# Patient Record
Sex: FEMALE | Race: WHITE | NOT HISPANIC OR LATINO | Employment: OTHER | URBAN - METROPOLITAN AREA
[De-identification: names, ages, dates, MRNs, and addresses within clinical notes are randomized per-mention and may not be internally consistent; named-entity substitution may affect disease eponyms.]

---

## 2017-01-11 ENCOUNTER — OFFICE VISIT (OUTPATIENT)
Dept: FAMILY MEDICINE CLINIC | Facility: CLINIC | Age: 82
End: 2017-01-11

## 2017-01-11 VITALS
BODY MASS INDEX: 19.67 KG/M2 | OXYGEN SATURATION: 95 % | WEIGHT: 111 LBS | TEMPERATURE: 98.3 F | SYSTOLIC BLOOD PRESSURE: 102 MMHG | DIASTOLIC BLOOD PRESSURE: 60 MMHG | HEART RATE: 98 BPM | HEIGHT: 63 IN

## 2017-01-11 DIAGNOSIS — F43.21 COMPLICATED GRIEF: ICD-10-CM

## 2017-01-11 DIAGNOSIS — J30.9 ATOPIC RHINITIS: Primary | ICD-10-CM

## 2017-01-11 DIAGNOSIS — J01.10 ACUTE NON-RECURRENT FRONTAL SINUSITIS: ICD-10-CM

## 2017-01-11 PROBLEM — C55 MALIGNANT NEOPLASM OF UTERUS (HCC): Status: ACTIVE | Noted: 2017-01-11

## 2017-01-11 PROBLEM — E78.5 HYPERLIPIDEMIA: Status: ACTIVE | Noted: 2017-01-11

## 2017-01-11 PROCEDURE — 99214 OFFICE O/P EST MOD 30 MIN: CPT | Performed by: NURSE PRACTITIONER

## 2017-01-11 RX ORDER — FLUTICASONE PROPIONATE 50 MCG
2 SPRAY, SUSPENSION (ML) NASAL DAILY
Qty: 1 EACH | Refills: 0 | Status: SHIPPED | OUTPATIENT
Start: 2017-01-11 | End: 2017-02-10

## 2017-01-11 RX ORDER — AZITHROMYCIN 250 MG/1
TABLET, FILM COATED ORAL
Qty: 6 TABLET | Refills: 0 | Status: SHIPPED | OUTPATIENT
Start: 2017-01-11 | End: 2017-02-03 | Stop reason: HOSPADM

## 2017-01-11 NOTE — MR AVS SNAPSHOT
Chiquita Anderson   1/11/2017 2:00 PM   Office Visit    Provider:  KIRSTEN Martinez   Department:  [facility]   Dept Phone:  [phone]                Your Full Care Plan              Today's Medication Changes          These changes are accurate as of: 1/11/17  2:34 PM.  If you have any questions, ask your nurse or doctor.               New Medication(s)Ordered:     azithromycin 250 MG tablet   Commonly known as:  ZITHROMAX   Take 2 tablets the first day, then 1 tablet daily for 4 days.   Started by:  KIRSTEN Martinez       fluticasone 50 MCG/ACT nasal spray   Commonly known as:  FLONASE   2 sprays into each nostril Daily for 30 days. Administer 2 sprays in each nostril for each dose.   Started by:  KIRSTEN Martinez            Where to Get Your Medications      These medications were sent to [address] [address] [facility] [phone] [facility] [phone]   [address] [facility] [address]     Phone:  [phone]     azithromycin 250 MG tablet    fluticasone 50 MCG/ACT nasal spray                  Your Updated Medication List          This list is accurate as of: 1/11/17  2:34 PM.  Always use your most recent med list.                azithromycin 250 MG tablet   Commonly known as:  ZITHROMAX   Take 2 tablets the first day, then 1 tablet daily for 4 days.       clotrimazole 1 % cream   Commonly known as:  LOTRIMIN   Apply  topically 2 (Two) Times a Day.       FLUoxetine 20 MG tablet   Commonly known as:  PROzac   Take 1 tablet by mouth Daily for 90 days.       fluticasone 50 MCG/ACT nasal spray   Commonly known as:  FLONASE   2 sprays into each nostril Daily for 30 days. Administer 2 sprays in each nostril for each dose.       furosemide 20 MG tablet   Commonly known as:  LASIX       MULTIVITAL PO       pantoprazole 40 MG EC tablet   Commonly known as:  PROTONIX       pravastatin 80 MG tablet   Commonly known as:   "PRAVACHOL   TAKE ONE TABLET BY MOUTH DAILY       Vitamin D 1000 UNITS capsule       warfarin 5 MG tablet   Commonly known as:  COUMADIN               You Were Diagnosed With        Codes Comments    Atopic rhinitis    -  Primary ICD-10-CM: J30.9  ICD-9-CM: 477.9     Acute non-recurrent frontal sinusitis     ICD-10-CM: J01.10  ICD-9-CM: 461.1       Medications to be Given to You by a Medical Professional     Due       Frequency    12/29/2016 cyanocobalamin injection 1,000 mcg  Every 30 Days      Instructions     None    Patient Instructions History      MyChart Signup     Our records indicate that you have declined [facility] [employer] signup. If you would like to sign up for [employer], please email [email or web address] or call [phone] to obtain an activation code.             Other Info from Your Visit           Your Appointments     Jan 27, 2017  2:00 PM EST   Nurse Visit with NURSE/MA KELLY [facility] (--)    [address] [place] [id number]   [phone]            Feb 02, 2017  2:30 PM EST   Office Visit with KIRSTEN Martinez   [facility] (--)    [address] [place] [id number] [phone]           Arrive 15 minutes prior to appointment.              Allergies     Amiodarone      Lung toxicity    Amoxicillin-pot Clavulanate        Reason for Visit     Illness pt has sore throat, cough, congestion and coughing up beige mucus.      Vital Signs     Blood Pressure Pulse Temperature Height Weight Oxygen Saturation    102/60 (BP Location: Right arm, Patient Position: Sitting, Cuff Size: Adult) 98 98.3 °F (36.8 °C) (Oral) 63\" (160 cm) 111 lb (50.3 kg) 95%    Body Mass Index Smoking Status                19.66 kg/m2 Never Smoker          Problems and Diagnoses Noted     Nasal inflammation due to allergen    Acid reflux disease    High cholesterol or triglycerides    Long term use " of blood thinners    Cancer of uterus    Atrial fibrillation (irregular heartbeat)    Acute non-recurrent frontal sinusitis

## 2017-01-11 NOTE — PROGRESS NOTES
"Bita Anderson is a 81 y.o. female. Pt is here for illness. She has been sick for the past 4-5 days with sore throat, cough and congestion. She is coughing up beige colored mucus.    History of Present Illness   Daughter with similar illness with ST and hoarseness, cough, +nasal discharge. Cough is productive. No headache. No fever or chills. +sneezing  The following portions of the patient's history were reviewed and updated as appropriate: allergies, current medications, past family history, past medical history, past social history, past surgical history and problem list.    Review of Systems   Constitutional: Negative for chills and fever.   HENT: Positive for congestion, ear pain, rhinorrhea, sinus pressure, sneezing and sore throat. Negative for ear discharge, facial swelling, hearing loss, nosebleeds and trouble swallowing.    Respiratory: Positive for cough. Negative for shortness of breath and wheezing.    Cardiovascular: Negative.  Negative for chest pain.   Gastrointestinal: Negative for abdominal pain.   Endocrine: Negative.    Musculoskeletal: Negative for arthralgias.   Allergic/Immunologic: Negative for environmental allergies.   Psychiatric/Behavioral: Positive for decreased concentration and dysphoric mood. Negative for self-injury and suicidal ideas. The patient is nervous/anxious.      Visit Vitals   • /60 (BP Location: Right arm, Patient Position: Sitting, Cuff Size: Adult)   • Pulse 98   • Temp 98.3 °F (36.8 °C) (Oral)   • Ht 63\" (160 cm)   • Wt 111 lb (50.3 kg)   • SpO2 95%   • BMI 19.66 kg/m2     Objective   Physical Exam   Constitutional: She appears cachectic. No distress.   HENT:   Head: Normocephalic.   Right Ear: Tympanic membrane is retracted. A middle ear effusion is present.   Left Ear: Tympanic membrane is retracted. A middle ear effusion is present.   Nose: Mucosal edema and rhinorrhea present.   Mouth/Throat: Oropharyngeal exudate and posterior oropharyngeal erythema " present.   Cardiovascular: Normal rate, regular rhythm, normal heart sounds and intact distal pulses.    Pulmonary/Chest: Effort normal and breath sounds normal. No respiratory distress. She has no wheezes. She has no rales.   Lymphadenopathy:     She has no cervical adenopathy.   Skin: She is not diaphoretic.   Psychiatric: Judgment and thought content normal. She exhibits a depressed mood.   Smiling more     Assessment/Plan   Problems Addressed this Visit        Respiratory    Atopic rhinitis - Primary       Other    Complicated grief      Other Visit Diagnoses     Acute non-recurrent frontal sinusitis             Pt is here for illness. She has been sick for the past 4-5 days with sore throat, cough and congestion. She is coughing up beige colored mucus. Reports is using nose spray, reports is 4 yrs old. To continue, but with new refill.     INR 2.1 yesterday, to start azithromycin, let coag clinic know for earlier recheck.     To have upper scope in 1-2 weeks for acid reflux, reasonable, if additional difficulty with aspiration    With grief, has a new mattress, but still sleeping on couch (since March) reports eating as much as can, but still intermittent. Daughter still stocking her fridge as well. Overall better eye contact and smiling today. Mildly able to keep more on story total, though still some vocalizations of sadness. Continue same med now, consider increase in dosing at follow up.

## 2017-01-27 ENCOUNTER — CLINICAL SUPPORT (OUTPATIENT)
Dept: FAMILY MEDICINE CLINIC | Facility: CLINIC | Age: 82
End: 2017-01-27

## 2017-01-27 DIAGNOSIS — E53.8 B12 DEFICIENCY: ICD-10-CM

## 2017-01-27 PROCEDURE — 96372 THER/PROPH/DIAG INJ SC/IM: CPT | Performed by: FAMILY MEDICINE

## 2017-01-27 RX ADMIN — CYANOCOBALAMIN 1000 MCG: 1000 INJECTION, SOLUTION INTRAMUSCULAR; SUBCUTANEOUS at 14:02

## 2017-02-03 ENCOUNTER — OFFICE VISIT (OUTPATIENT)
Dept: FAMILY MEDICINE CLINIC | Facility: CLINIC | Age: 82
End: 2017-02-03

## 2017-02-03 VITALS
OXYGEN SATURATION: 100 % | SYSTOLIC BLOOD PRESSURE: 128 MMHG | DIASTOLIC BLOOD PRESSURE: 68 MMHG | HEIGHT: 63 IN | TEMPERATURE: 97 F | WEIGHT: 109 LBS | HEART RATE: 67 BPM | BODY MASS INDEX: 19.31 KG/M2

## 2017-02-03 DIAGNOSIS — E53.8 B12 DEFICIENCY: Primary | ICD-10-CM

## 2017-02-03 DIAGNOSIS — E55.9 VITAMIN D DEFICIENCY: ICD-10-CM

## 2017-02-03 DIAGNOSIS — F43.21 COMPLICATED GRIEF: ICD-10-CM

## 2017-02-03 DIAGNOSIS — R63.4 WEIGHT LOSS, ABNORMAL: ICD-10-CM

## 2017-02-03 DIAGNOSIS — D51.9 ANEMIA DUE TO VITAMIN B12 DEFICIENCY, UNSPECIFIED B12 DEFICIENCY TYPE: ICD-10-CM

## 2017-02-03 PROCEDURE — 99214 OFFICE O/P EST MOD 30 MIN: CPT | Performed by: NURSE PRACTITIONER

## 2017-02-03 RX ORDER — FLUOXETINE HYDROCHLORIDE 40 MG/1
40 CAPSULE ORAL DAILY
Qty: 30 CAPSULE | Refills: 0
Start: 2017-02-03 | End: 2017-02-06 | Stop reason: SDUPTHER

## 2017-02-03 NOTE — PROGRESS NOTES
"Subjective   Chiquita Anderson is a 81 y.o. female who is with a possible sinus infection and follow up on depression.     History of Present Illness   Seems to stay weak and tired. Not fully over sinus infection. Not eating many vegetables, Has not started ensure, and won't start until Helena oks, though coagulation nurse has already given ok, more than once.    Still not sleeping well. [phone]. Not sleeping in the bed.   The following portions of the patient's history were reviewed and updated as appropriate: allergies, current medications, past family history, past medical history, past social history, past surgical history and problem list.    Review of Systems   Constitutional: Positive for fatigue and unexpected weight change. Negative for activity change, appetite change, chills and fever.   HENT: Positive for ear pain. Negative for congestion and sore throat.    Cardiovascular: Negative for chest pain, palpitations and leg swelling.   Gastrointestinal: Negative for abdominal pain and constipation.        Appetite comes and goes, some days good   Endocrine: Negative.    Genitourinary: Positive for urgency (whan takes water  pill in the mornings).   Musculoskeletal: Positive for arthralgias and gait problem. Negative for myalgias and neck stiffness.   Allergic/Immunologic: Negative for food allergies and immunocompromised state.   Neurological: Positive for headaches.   Hematological: Negative.    Psychiatric/Behavioral: Positive for behavioral problems and confusion (daughter believes worse when fatigued).     Visit Vitals   • /68 (BP Location: Right arm, Patient Position: Sitting, Cuff Size: Adult)   • Pulse 67   • Temp 97 °F (36.1 °C) (Oral)   • Ht 63\" (160 cm)   • Wt 109 lb (49.4 kg)   • SpO2 100%   • BMI 19.31 kg/m2     Objective   Physical Exam   Constitutional: She appears well-developed. She appears cachectic.   HENT:   Head: Normocephalic and atraumatic.   Right Ear: Tympanic membrane is scarred. "   Left Ear: Tympanic membrane is scarred.   Nose: No mucosal edema or rhinorrhea. Right sinus exhibits no maxillary sinus tenderness and no frontal sinus tenderness. Left sinus exhibits no maxillary sinus tenderness and no frontal sinus tenderness.   Mouth/Throat: Oropharynx is clear and moist.   Neck: Normal range of motion. Neck supple. No thyromegaly present.   Cardiovascular: Normal rate, regular rhythm and normal heart sounds.    Pulses:       Carotid pulses are 2+ on the right side, and 2+ on the left side.  Pulmonary/Chest: Effort normal and breath sounds normal.   Abdominal: Soft. Bowel sounds are normal. There is no tenderness.   Lymphadenopathy:        Head (right side): No submental, no submandibular and no tonsillar adenopathy present.        Head (left side): No submental, no submandibular and no tonsillar adenopathy present.     She has no cervical adenopathy.   Neurological: She is alert.   Skin: She is not diaphoretic.   Psychiatric: She has a normal mood and affect. Her behavior is normal. Judgment and thought content normal.     Assessment/Plan   Problems Addressed this Visit        Digestive    B12 deficiency anemia    Relevant Orders    CBC (No Diff) (Completed)    B12 deficiency - Primary    Relevant Orders    Vitamin B12 (Completed)    Vitamin D deficiency    Relevant Orders    Vitamin D 25 Hydroxy (Completed)       Other    Weight loss, abnormal    Relevant Orders    Comprehensive Metabolic Panel (Completed)    T4, Free (Completed)    TSH (Completed)    Complicated grief        To address sleep, Has cut back on coffee, has significant upset over furnace making noise, etc. Mostly not sleeping well, recommend increase fluoxetine if no significant abnormal on labs, consider retry of mirtazapine, as took only 1 dose. Other AD. Strongly encouraged to seek grief counseling. Start ensure, start sleeping in a bed, though won't sleep in new bed in the master, nor will consider sleeping in second  bedroom, which was  son's.     FU 3 months, sooner if needed

## 2017-02-04 LAB
25(OH)D3+25(OH)D2 SERPL-MCNC: 35.2 NG/ML (ref 30–100)
ALBUMIN SERPL-MCNC: 4.2 G/DL (ref 3.5–4.7)
ALBUMIN/GLOB SERPL: 2 {RATIO} (ref 1.1–2.5)
ALP SERPL-CCNC: 75 IU/L (ref 39–117)
ALT SERPL-CCNC: 21 IU/L (ref 0–32)
AST SERPL-CCNC: 34 IU/L (ref 0–40)
BILIRUB SERPL-MCNC: 0.5 MG/DL (ref 0–1.2)
BUN SERPL-MCNC: 16 MG/DL (ref 8–27)
BUN/CREAT SERPL: 25 (ref 11–26)
CALCIUM SERPL-MCNC: 8.9 MG/DL (ref 8.7–10.3)
CHLORIDE SERPL-SCNC: 99 MMOL/L (ref 96–106)
CO2 SERPL-SCNC: 25 MMOL/L (ref 18–29)
CREAT SERPL-MCNC: 0.65 MG/DL (ref 0.57–1)
ERYTHROCYTE [DISTWIDTH] IN BLOOD BY AUTOMATED COUNT: 15.2 % (ref 12.3–15.4)
GLOBULIN SER CALC-MCNC: 2.1 G/DL (ref 1.5–4.5)
GLUCOSE SERPL-MCNC: 83 MG/DL (ref 65–99)
HCT VFR BLD AUTO: 32.9 % (ref 34–46.6)
HGB BLD-MCNC: 11 G/DL (ref 11.1–15.9)
MCH RBC QN AUTO: 27.6 PG (ref 26.6–33)
MCHC RBC AUTO-ENTMCNC: 33.4 G/DL (ref 31.5–35.7)
MCV RBC AUTO: 83 FL (ref 79–97)
PLATELET # BLD AUTO: 190 X10E3/UL (ref 150–379)
POTASSIUM SERPL-SCNC: 3.9 MMOL/L (ref 3.5–5.2)
PROT SERPL-MCNC: 6.3 G/DL (ref 6–8.5)
RBC # BLD AUTO: 3.99 X10E6/UL (ref 3.77–5.28)
SODIUM SERPL-SCNC: 141 MMOL/L (ref 134–144)
T4 FREE SERPL-MCNC: 0.81 NG/DL (ref 0.82–1.77)
TSH SERPL DL<=0.005 MIU/L-ACNC: 3.61 UIU/ML (ref 0.45–4.5)
VIT B12 SERPL-MCNC: 864 PG/ML (ref 211–946)
WBC # BLD AUTO: 4.7 X10E3/UL (ref 3.4–10.8)

## 2017-02-06 ENCOUNTER — TELEPHONE (OUTPATIENT)
Dept: FAMILY MEDICINE CLINIC | Facility: CLINIC | Age: 82
End: 2017-02-06

## 2017-02-06 RX ORDER — FLUOXETINE HYDROCHLORIDE 40 MG/1
40 CAPSULE ORAL DAILY
Qty: 30 CAPSULE | Refills: 3 | Status: SHIPPED | OUTPATIENT
Start: 2017-02-06 | End: 2017-03-01 | Stop reason: HOSPADM

## 2017-02-08 ENCOUNTER — TELEPHONE (OUTPATIENT)
Dept: FAMILY MEDICINE CLINIC | Facility: CLINIC | Age: 82
End: 2017-02-08

## 2017-02-08 NOTE — TELEPHONE ENCOUNTER
Pts daughter said her mother won't take the 40mg at all.  Pts daughter said her mother said she won't take the 40mg and wants to know if you will just continue prescribing the 20mg to her mother.  Pts daughter said her mother won't go to counseling either.  C: [phone]

## 2017-02-20 RX ORDER — PRAVASTATIN SODIUM 80 MG/1
TABLET ORAL
Qty: 90 TABLET | Refills: 0 | Status: SHIPPED | OUTPATIENT
Start: 2017-02-20 | End: 2017-05-10

## 2017-02-27 ENCOUNTER — CLINICAL SUPPORT (OUTPATIENT)
Dept: FAMILY MEDICINE CLINIC | Facility: CLINIC | Age: 82
End: 2017-02-27

## 2017-02-27 DIAGNOSIS — E53.8 B12 DEFICIENCY: ICD-10-CM

## 2017-02-27 PROCEDURE — 96372 THER/PROPH/DIAG INJ SC/IM: CPT | Performed by: FAMILY MEDICINE

## 2017-02-27 RX ADMIN — CYANOCOBALAMIN 1000 MCG: 1000 INJECTION, SOLUTION INTRAMUSCULAR; SUBCUTANEOUS at 13:48

## 2017-03-01 ENCOUNTER — OFFICE VISIT (OUTPATIENT)
Dept: FAMILY MEDICINE CLINIC | Facility: CLINIC | Age: 82
End: 2017-03-01

## 2017-03-01 VITALS
BODY MASS INDEX: 20.02 KG/M2 | OXYGEN SATURATION: 99 % | SYSTOLIC BLOOD PRESSURE: 136 MMHG | HEART RATE: 75 BPM | HEIGHT: 63 IN | TEMPERATURE: 97.9 F | DIASTOLIC BLOOD PRESSURE: 66 MMHG | WEIGHT: 113 LBS

## 2017-03-01 DIAGNOSIS — S90.02XA CONTUSION OF LEFT ANKLE, INITIAL ENCOUNTER: ICD-10-CM

## 2017-03-01 DIAGNOSIS — F43.21 COMPLICATED GRIEF: Primary | ICD-10-CM

## 2017-03-01 PROCEDURE — 99213 OFFICE O/P EST LOW 20 MIN: CPT | Performed by: NURSE PRACTITIONER

## 2017-03-01 RX ORDER — FLUOXETINE HYDROCHLORIDE 20 MG/1
20 CAPSULE ORAL DAILY
COMMUNITY
End: 2017-03-01 | Stop reason: ALTCHOICE

## 2017-03-01 NOTE — PROGRESS NOTES
"Subjective   Chiquita Anderson is a 81 y.o. female who is here for a bruise on her left lower leg and would also like to discuss her depression meds.     History of Present Illness   On anticoagulation, bruise onset after backed into her table 5 days ago, worried about bruising and knot on LLE medial calf.     Daughter reports voice loss in last 3 weeks. States mom reporting a lot of dizziness associated with dosing of medications, though pt denies.   The following portions of the patient's history were reviewed and updated as appropriate: allergies, current medications, past family history, past medical history, past social history, past surgical history and problem list.    Review of Systems   Constitutional: Positive for fatigue and unexpected weight change (4 lb gain). Negative for activity change, appetite change, chills and fever.   HENT: Positive for rhinorrhea. Negative for ear pain.    Cardiovascular: Negative for chest pain, palpitations and leg swelling.        Sees cardiology next week.   Musculoskeletal: Positive for back pain (arthritis). Neck pain: arthritis.   Allergic/Immunologic: Positive for environmental allergies.   Neurological: Positive for dizziness. Negative for tremors, seizures, weakness and light-headedness.        No falls   Hematological: Bruises/bleeds easily.   Psychiatric/Behavioral: Positive for dysphoric mood and sleep disturbance. Negative for self-injury and suicidal ideas.     Visit Vitals   • /66 (BP Location: Right arm, Patient Position: Sitting, Cuff Size: Adult)   • Pulse 75   • Temp 97.9 °F (36.6 °C) (Oral)   • Ht 63\" (160 cm)   • Wt 113 lb (51.3 kg)   • SpO2 99%   • BMI 20.02 kg/m2     Objective   Physical Exam   Constitutional: She appears well-developed and well-nourished. No distress.   Cardiovascular: Normal rate.    Pulmonary/Chest: Effort normal.   Skin: Skin is warm and dry. She is not diaphoretic. There is erythema (medial shin  L lower leg).   Psychiatric: Thought " content normal. Her mood appears anxious. Cognition and memory are impaired. She exhibits a depressed mood. She is inattentive.   Nursing note and vitals reviewed.    Assessment/Plan   Problems Addressed this Visit        Other    Complicated grief - Primary      Other Visit Diagnoses     Contusion of left ankle, initial encounter            Still fidgeting day and night. Will not return to the bed. Will start new sertraline stop fluoxetine. Call if any side effects. Strongly encouraged to consider grief counseling.

## 2017-03-02 ENCOUNTER — TELEPHONE (OUTPATIENT)
Dept: FAMILY MEDICINE CLINIC | Facility: CLINIC | Age: 82
End: 2017-03-02

## 2017-03-10 ENCOUNTER — OFFICE VISIT (OUTPATIENT)
Dept: FAMILY MEDICINE CLINIC | Facility: CLINIC | Age: 82
End: 2017-03-10

## 2017-03-10 VITALS
OXYGEN SATURATION: 98 % | SYSTOLIC BLOOD PRESSURE: 150 MMHG | BODY MASS INDEX: 20.55 KG/M2 | DIASTOLIC BLOOD PRESSURE: 68 MMHG | WEIGHT: 116 LBS | HEIGHT: 63 IN | HEART RATE: 68 BPM | TEMPERATURE: 98 F

## 2017-03-10 DIAGNOSIS — R64 CACHEXIA (HCC): Primary | ICD-10-CM

## 2017-03-10 DIAGNOSIS — F43.21 COMPLICATED GRIEF: ICD-10-CM

## 2017-03-10 PROCEDURE — 99213 OFFICE O/P EST LOW 20 MIN: CPT | Performed by: NURSE PRACTITIONER

## 2017-03-10 NOTE — PROGRESS NOTES
"Bita Anderson is a 81 y.o. female who presents for a follow up on complicated grief. Was having dizzy spells and abnormal INR while on sertraline. Stopped taking 3 days ago.     History of Present Illness   Took sertraline for 1 week and noted throat burning and GI upset. Reports a fall in the kitchen, not hurt due to dizziness while on. Doing better since off now for 3 days, does eat with daughter's family often.     The following portions of the patient's history were reviewed and updated as appropriate: allergies, current medications, past family history, past medical history, past social history, past surgical history and problem list.    Review of Systems   Constitutional: Positive for activity change and fatigue. Negative for fever and unexpected weight change.   HENT: Negative.    Respiratory: Negative.    Cardiovascular: Negative.    Gastrointestinal: Negative.  Negative for abdominal pain.   Endocrine: Negative for cold intolerance, heat intolerance, polydipsia, polyphagia and polyuria.   Musculoskeletal: Positive for arthralgias and back pain.   Neurological: Negative for seizures, light-headedness and headaches.   Hematological: Negative.    Psychiatric/Behavioral: Positive for decreased concentration, dysphoric mood and sleep disturbance (slept unitl 9am this morning). Negative for agitation, confusion, hallucinations, self-injury and suicidal ideas. The patient is nervous/anxious. The patient is not hyperactive.         Grief     Visit Vitals   • /68   • Pulse 68   • Temp 98 °F (36.7 °C) (Oral)   • Ht 63\" (160 cm)   • Wt 116 lb (52.6 kg)   • SpO2 98%   • BMI 20.55 kg/m2     Objective   Physical Exam   Constitutional: She appears well-developed and well-nourished.   Cardiovascular: Normal rate.    Pulmonary/Chest: Effort normal.   Skin: Skin is warm and dry.   Psychiatric: Her behavior is normal. Judgment and thought content normal. Her mood appears anxious. She exhibits a depressed " mood.   Nursing note and vitals reviewed.    Assessment/Plan   Problems Addressed this Visit        Other    Cachexia - Primary    Complicated grief        Desires a trial without medication for now. Encouraged to rest and eat well. Keep phone with her at all times. FU prn and 6 months. Best option if tried medication would be mirtazapine.

## 2017-03-20 ENCOUNTER — OFFICE VISIT (OUTPATIENT)
Dept: FAMILY MEDICINE CLINIC | Facility: CLINIC | Age: 82
End: 2017-03-20

## 2017-03-20 VITALS
HEART RATE: 101 BPM | DIASTOLIC BLOOD PRESSURE: 76 MMHG | HEIGHT: 63 IN | OXYGEN SATURATION: 99 % | TEMPERATURE: 97.6 F | BODY MASS INDEX: 20.2 KG/M2 | WEIGHT: 114 LBS | SYSTOLIC BLOOD PRESSURE: 128 MMHG

## 2017-03-20 DIAGNOSIS — M54.50 ACUTE MIDLINE LOW BACK PAIN WITHOUT SCIATICA: ICD-10-CM

## 2017-03-20 DIAGNOSIS — R26.9 GAIT ABNORMALITY: Primary | ICD-10-CM

## 2017-03-20 PROCEDURE — 99213 OFFICE O/P EST LOW 20 MIN: CPT | Performed by: NURSE PRACTITIONER

## 2017-03-20 PROCEDURE — 72100 X-RAY EXAM L-S SPINE 2/3 VWS: CPT | Performed by: NURSE PRACTITIONER

## 2017-03-20 NOTE — PROGRESS NOTES
"Bita Anderson is a 81 y.o. female who presents for follow up after a fall that happened at home 1 week ago. Tripped over slippers, hit head on cabinet and landed on buttocks.     History of Present Illness   Reports since the fall feeling pain in buttocks since the fall. Also having some low back pain since the fall. Distantly had epidurals from Dr. Martinez. Here today as pain in the back area. No pain until the fall.   The following portions of the patient's history were reviewed and updated as appropriate: allergies, current medications, past family history, past medical history, past social history, past surgical history and problem list.    Review of Systems   Constitutional: Positive for activity change. Negative for fever.   Respiratory: Negative for chest tightness and shortness of breath.    Gastrointestinal: Negative for constipation (no bowel or bladder dysfunction) and diarrhea.   Genitourinary: Negative for difficulty urinating.   Musculoskeletal: Positive for back pain and gait problem. Negative for arthralgias, joint swelling and myalgias.   Neurological: Negative for weakness (leg) and headaches.   Psychiatric/Behavioral: Positive for dysphoric mood. The patient is nervous/anxious.      Visit Vitals   • /76   • Pulse 101   • Temp 97.6 °F (36.4 °C) (Oral)   • Ht 63\" (160 cm)   • Wt 114 lb (51.7 kg)   • SpO2 99%   • BMI 20.19 kg/m2     Objective   Physical Exam   Constitutional: She is oriented to person, place, and time. She appears well-developed and well-nourished. No distress.   Musculoskeletal: She exhibits no edema or tenderness.        Lumbar back: She exhibits pain. She exhibits normal range of motion, no tenderness, no bony tenderness, no swelling, no edema and no spasm.   SLR unique negative, Angelo negative unique, + SI tenderness, negative piriformis to distraction   Neurological: She is alert and oriented to person, place, and time. No sensory deficit. She exhibits normal muscle " tone.   Reflex Scores:       Patellar reflexes are 2+ on the right side and 2+ on the left side.       Achilles reflexes are 2+ on the right side and 2+ on the left side.  Nursing note and vitals reviewed.    Assessment/Plan   Problems Addressed this Visit        Other    Gait abnormality - Primary      Other Visit Diagnoses     Acute midline low back pain without sciatica        Relevant Orders    XR Spine Lumbar 2 or 3 View        Alternate ice and heat. Try tylenol for the back pain. FU 2-3 weeks if not settling. Topical cream if needed for pain.    XR with NAD, no comparison available. Will send to radiology for opinion.

## 2017-03-21 ENCOUNTER — TELEPHONE (OUTPATIENT)
Dept: FAMILY MEDICINE CLINIC | Facility: CLINIC | Age: 82
End: 2017-03-21

## 2017-03-21 NOTE — TELEPHONE ENCOUNTER
Patients daughter agrees to MRI of spine as suggested by radiologist. She also wants a ct of head ordered. Patient had nose bleed this morning and is worried. The bleeding has stopped. She has appt for INR with cardiology tomorrow. Patient advised if bleed starts again and does not stop then proceed to ER as is on blood thinners. Need orders for MRI and CT if okay.

## 2017-03-22 DIAGNOSIS — IMO0002 COMPRESSION FRACTURE: ICD-10-CM

## 2017-03-22 DIAGNOSIS — R04.0 EPISTAXIS: Primary | ICD-10-CM

## 2017-03-29 ENCOUNTER — CLINICAL SUPPORT (OUTPATIENT)
Dept: FAMILY MEDICINE CLINIC | Facility: CLINIC | Age: 82
End: 2017-03-29

## 2017-03-29 DIAGNOSIS — D51.9 ANEMIA DUE TO VITAMIN B12 DEFICIENCY, UNSPECIFIED B12 DEFICIENCY TYPE: ICD-10-CM

## 2017-03-29 PROCEDURE — 96372 THER/PROPH/DIAG INJ SC/IM: CPT | Performed by: FAMILY MEDICINE

## 2017-03-29 RX ADMIN — CYANOCOBALAMIN 1000 MCG: 1000 INJECTION, SOLUTION INTRAMUSCULAR; SUBCUTANEOUS at 13:53

## 2017-04-04 ENCOUNTER — TELEPHONE (OUTPATIENT)
Dept: FAMILY MEDICINE CLINIC | Facility: CLINIC | Age: 82
End: 2017-04-04

## 2017-04-05 NOTE — TELEPHONE ENCOUNTER
No new findings in the brain, age related changes only. Lspine with Degenerative disc disease and subacute compression fractures. I feel most of her back pain coming from degenerative disc disease, recommend epidural injection if still with intolerable levels of back pain.

## 2017-04-05 NOTE — TELEPHONE ENCOUNTER
Patient and daughter notified. Patient is going for INR today, will discuss epidural with cardiology and call back if desires to proceed.

## 2017-04-24 ENCOUNTER — CLINICAL SUPPORT (OUTPATIENT)
Dept: FAMILY MEDICINE CLINIC | Facility: CLINIC | Age: 82
End: 2017-04-24

## 2017-04-24 DIAGNOSIS — E53.8 B12 DEFICIENCY: ICD-10-CM

## 2017-04-24 PROCEDURE — 96372 THER/PROPH/DIAG INJ SC/IM: CPT | Performed by: NURSE PRACTITIONER

## 2017-04-24 RX ADMIN — CYANOCOBALAMIN 1000 MCG: 1000 INJECTION, SOLUTION INTRAMUSCULAR; SUBCUTANEOUS at 14:04

## 2017-04-27 ENCOUNTER — TELEPHONE (OUTPATIENT)
Dept: FAMILY MEDICINE CLINIC | Facility: CLINIC | Age: 82
End: 2017-04-27

## 2017-04-27 DIAGNOSIS — M54.50 LUMBAR SPINE PAIN: ICD-10-CM

## 2017-04-27 DIAGNOSIS — R29.898 LEG WEAKNESS, BILATERAL: Primary | ICD-10-CM

## 2017-05-10 ENCOUNTER — OFFICE VISIT (OUTPATIENT)
Dept: FAMILY MEDICINE CLINIC | Facility: CLINIC | Age: 82
End: 2017-05-10

## 2017-05-10 VITALS
TEMPERATURE: 97.4 F | DIASTOLIC BLOOD PRESSURE: 74 MMHG | OXYGEN SATURATION: 99 % | HEIGHT: 63 IN | SYSTOLIC BLOOD PRESSURE: 122 MMHG | BODY MASS INDEX: 21.09 KG/M2 | WEIGHT: 119 LBS | HEART RATE: 76 BPM

## 2017-05-10 DIAGNOSIS — R26.9 GAIT ABNORMALITY: Primary | ICD-10-CM

## 2017-05-10 PROCEDURE — 99213 OFFICE O/P EST LOW 20 MIN: CPT | Performed by: NURSE PRACTITIONER

## 2017-05-17 ENCOUNTER — OFFICE VISIT (OUTPATIENT)
Dept: FAMILY MEDICINE CLINIC | Facility: CLINIC | Age: 82
End: 2017-05-17

## 2017-05-17 VITALS
TEMPERATURE: 98.6 F | DIASTOLIC BLOOD PRESSURE: 62 MMHG | HEART RATE: 91 BPM | SYSTOLIC BLOOD PRESSURE: 118 MMHG | BODY MASS INDEX: 21.62 KG/M2 | OXYGEN SATURATION: 99 % | WEIGHT: 122 LBS | HEIGHT: 63 IN

## 2017-05-17 DIAGNOSIS — Z00.00 MEDICARE ANNUAL WELLNESS VISIT, INITIAL: Primary | ICD-10-CM

## 2017-05-17 PROCEDURE — G0438 PPPS, INITIAL VISIT: HCPCS | Performed by: NURSE PRACTITIONER

## 2017-05-30 ENCOUNTER — CLINICAL SUPPORT (OUTPATIENT)
Dept: FAMILY MEDICINE CLINIC | Facility: CLINIC | Age: 82
End: 2017-05-30

## 2017-05-30 DIAGNOSIS — D51.9 ANEMIA DUE TO VITAMIN B12 DEFICIENCY, UNSPECIFIED B12 DEFICIENCY TYPE: ICD-10-CM

## 2017-05-30 PROCEDURE — 96372 THER/PROPH/DIAG INJ SC/IM: CPT | Performed by: FAMILY MEDICINE

## 2017-05-30 RX ADMIN — CYANOCOBALAMIN 1000 MCG: 1000 INJECTION, SOLUTION INTRAMUSCULAR; SUBCUTANEOUS at 13:49

## 2017-06-22 ENCOUNTER — TELEPHONE (OUTPATIENT)
Dept: FAMILY MEDICINE CLINIC | Facility: CLINIC | Age: 82
End: 2017-06-22

## 2017-06-22 DIAGNOSIS — R29.898 BILATERAL LEG WEAKNESS: Primary | ICD-10-CM

## 2017-06-22 NOTE — TELEPHONE ENCOUNTER
Patient went to PT one time in May and then stopped going to swelling in legs. She wants to go back now but the rehab had discharged her for not coming. She needs a new order and wants it faxed to [phone]. Okay for order?

## 2017-07-03 ENCOUNTER — CLINICAL SUPPORT (OUTPATIENT)
Dept: FAMILY MEDICINE CLINIC | Facility: CLINIC | Age: 82
End: 2017-07-03

## 2017-07-03 DIAGNOSIS — D51.9 ANEMIA DUE TO VITAMIN B12 DEFICIENCY, UNSPECIFIED B12 DEFICIENCY TYPE: ICD-10-CM

## 2017-07-03 PROCEDURE — 96372 THER/PROPH/DIAG INJ SC/IM: CPT | Performed by: FAMILY MEDICINE

## 2017-07-03 RX ADMIN — CYANOCOBALAMIN 1000 MCG: 1000 INJECTION, SOLUTION INTRAMUSCULAR; SUBCUTANEOUS at 13:59

## 2017-08-28 ENCOUNTER — OFFICE VISIT (OUTPATIENT)
Dept: FAMILY MEDICINE CLINIC | Facility: CLINIC | Age: 82
End: 2017-08-28

## 2017-08-28 VITALS
BODY MASS INDEX: 19.67 KG/M2 | DIASTOLIC BLOOD PRESSURE: 72 MMHG | WEIGHT: 111 LBS | SYSTOLIC BLOOD PRESSURE: 118 MMHG | HEART RATE: 83 BPM | HEIGHT: 63 IN | OXYGEN SATURATION: 98 % | TEMPERATURE: 97.8 F

## 2017-08-28 DIAGNOSIS — R63.4 WEIGHT LOSS, ABNORMAL: ICD-10-CM

## 2017-08-28 DIAGNOSIS — F43.21 COMPLICATED GRIEF: Primary | ICD-10-CM

## 2017-08-28 DIAGNOSIS — E78.2 MIXED HYPERLIPIDEMIA: ICD-10-CM

## 2017-08-28 DIAGNOSIS — E53.8 B12 DEFICIENCY: ICD-10-CM

## 2017-08-28 DIAGNOSIS — R64 CACHEXIA (HCC): ICD-10-CM

## 2017-08-28 PROBLEM — I38 VALVULAR DISEASE: Status: ACTIVE | Noted: 2017-05-18

## 2017-08-28 PROCEDURE — 96372 THER/PROPH/DIAG INJ SC/IM: CPT | Performed by: NURSE PRACTITIONER

## 2017-08-28 PROCEDURE — 99213 OFFICE O/P EST LOW 20 MIN: CPT | Performed by: NURSE PRACTITIONER

## 2017-08-28 RX ADMIN — CYANOCOBALAMIN 1000 MCG: 1000 INJECTION, SOLUTION INTRAMUSCULAR; SUBCUTANEOUS at 14:46

## 2017-08-28 NOTE — PROGRESS NOTES
"Subjective   Chiquita Anderson is a 82 y.o. female who presents for a three month follow up on b12 deficiency, fatigue.   History of Present Illness   Reports finishing PT, walking better after therapy. Is walking in the yard. Continuing exercises at home. Does eat, but still losing weight. Still having difficulty cooking, except breakfast on the weekends, that she likes. Does eat oatmeal occasional. Not cooking much, but children taking her out to eat frequently, with fair appetite.     No further falls. Sleep reported fair at times, still some difficulty secondary to loss of spouse.   The following portions of the patient's history were reviewed and updated as appropriate: allergies, current medications, past family history, past medical history, past social history, past surgical history and problem list.    Review of Systems   Constitutional: Positive for appetite change (low). Negative for activity change and unexpected weight change.   HENT: Negative.    Eyes: Negative.  Negative for visual disturbance.   Respiratory: Negative.    Cardiovascular: Negative.  Negative for chest pain.   Gastrointestinal: Negative.  Negative for constipation and diarrhea.   Endocrine: Negative.    Genitourinary: Negative for difficulty urinating and frequency.   Musculoskeletal: Positive for back pain (improved) and gait problem.   Neurological: Negative for weakness and headaches.   Hematological: Negative.    Psychiatric/Behavioral: Positive for dysphoric mood and sleep disturbance.     /72  Pulse 83  Temp 97.8 °F (36.6 °C) (Oral)   Ht 63\" (160 cm)  Wt 111 lb (50.3 kg)  SpO2 98%  BMI 19.66 kg/m2    Objective   Physical Exam   Constitutional: She is oriented to person, place, and time. She appears well-developed and well-nourished. She appears cachectic.  Non-toxic appearance. She does not have a sickly appearance.   HENT:   Head: Normocephalic and atraumatic.   Mouth/Throat: No oropharyngeal exudate.   Neck: Normal range " of motion. Neck supple. No JVD present. No thyromegaly present.   Cardiovascular: Normal rate and regular rhythm.    Pulmonary/Chest: Effort normal and breath sounds normal.   Musculoskeletal:        Lumbar back: Normal. She exhibits normal range of motion and no tenderness.   Lymphadenopathy:     She has no cervical adenopathy.   Neurological: She is alert and oriented to person, place, and time.   Nursing note and vitals reviewed.    Assessment/Plan   Problems Addressed this Visit        Cardiovascular and Mediastinum    Hyperlipidemia    Relevant Orders    Lipid Panel       Digestive    B12 deficiency    Relevant Orders    Vitamin B12       Other    Cachexia    Relevant Orders    CBC & Differential    Comprehensive Metabolic Panel    TSH    Weight loss, abnormal    Relevant Orders    TSH    Complicated grief - Primary        Continue to eat frequent small meals. Moving well today, with little back pain, would hold FRANDY for now. Continue to follow with [facility] for afib. And anticoagulation. Continue HEP for back and gait. Continue to be more active. Ok for b12 today. FU 3 months.

## 2017-08-29 ENCOUNTER — TELEPHONE (OUTPATIENT)
Dept: FAMILY MEDICINE CLINIC | Facility: CLINIC | Age: 82
End: 2017-08-29

## 2017-08-29 LAB
ALBUMIN SERPL-MCNC: 4.2 G/DL (ref 3.5–4.7)
ALBUMIN/GLOB SERPL: 2.1 {RATIO} (ref 1.2–2.2)
ALP SERPL-CCNC: 89 IU/L (ref 39–117)
ALT SERPL-CCNC: 16 IU/L (ref 0–32)
AST SERPL-CCNC: 24 IU/L (ref 0–40)
BASOPHILS # BLD AUTO: 0 X10E3/UL (ref 0–0.2)
BASOPHILS NFR BLD AUTO: 0 %
BILIRUB SERPL-MCNC: 0.6 MG/DL (ref 0–1.2)
BUN SERPL-MCNC: 16 MG/DL (ref 8–27)
BUN/CREAT SERPL: 22 (ref 12–28)
CALCIUM SERPL-MCNC: 9.1 MG/DL (ref 8.7–10.3)
CHLORIDE SERPL-SCNC: 99 MMOL/L (ref 96–106)
CHOLEST SERPL-MCNC: 172 MG/DL (ref 100–199)
CO2 SERPL-SCNC: 24 MMOL/L (ref 18–29)
CREAT SERPL-MCNC: 0.73 MG/DL (ref 0.57–1)
EOSINOPHIL # BLD AUTO: 0.1 X10E3/UL (ref 0–0.4)
EOSINOPHIL NFR BLD AUTO: 1 %
ERYTHROCYTE [DISTWIDTH] IN BLOOD BY AUTOMATED COUNT: 17 % (ref 12.3–15.4)
GLOBULIN SER CALC-MCNC: 2 G/DL (ref 1.5–4.5)
GLUCOSE SERPL-MCNC: 76 MG/DL (ref 65–99)
HCT VFR BLD AUTO: 34.1 % (ref 34–46.6)
HDLC SERPL-MCNC: 74 MG/DL
HGB BLD-MCNC: 11.3 G/DL (ref 11.1–15.9)
IMM GRANULOCYTES # BLD: 0 X10E3/UL (ref 0–0.1)
IMM GRANULOCYTES NFR BLD: 0 %
LDLC SERPL CALC-MCNC: 87 MG/DL (ref 0–99)
LYMPHOCYTES # BLD AUTO: 1.8 X10E3/UL (ref 0.7–3.1)
LYMPHOCYTES NFR BLD AUTO: 25 %
MCH RBC QN AUTO: 26 PG (ref 26.6–33)
MCHC RBC AUTO-ENTMCNC: 33.1 G/DL (ref 31.5–35.7)
MCV RBC AUTO: 79 FL (ref 79–97)
MONOCYTES # BLD AUTO: 0.5 X10E3/UL (ref 0.1–0.9)
MONOCYTES NFR BLD AUTO: 7 %
NEUTROPHILS # BLD AUTO: 4.8 X10E3/UL (ref 1.4–7)
NEUTROPHILS NFR BLD AUTO: 67 %
PLATELET # BLD AUTO: 189 X10E3/UL (ref 150–379)
POTASSIUM SERPL-SCNC: 4.1 MMOL/L (ref 3.5–5.2)
PROT SERPL-MCNC: 6.2 G/DL (ref 6–8.5)
RBC # BLD AUTO: 4.34 X10E6/UL (ref 3.77–5.28)
SODIUM SERPL-SCNC: 139 MMOL/L (ref 134–144)
TRIGL SERPL-MCNC: 55 MG/DL (ref 0–149)
TSH SERPL DL<=0.005 MIU/L-ACNC: 1.63 UIU/ML (ref 0.45–4.5)
VIT B12 SERPL-MCNC: >2000 PG/ML (ref 211–946)
VLDLC SERPL CALC-MCNC: 11 MG/DL (ref 5–40)
WBC # BLD AUTO: 7.3 X10E3/UL (ref 3.4–10.8)

## 2017-08-29 NOTE — TELEPHONE ENCOUNTER
----- Message from KIRSTEN Martinez sent at 8/29/2017  8:15 AM EDT -----  Normal lab results. Normal cholesterol and blood counts.

## 2017-09-25 ENCOUNTER — CLINICAL SUPPORT (OUTPATIENT)
Dept: FAMILY MEDICINE CLINIC | Facility: CLINIC | Age: 82
End: 2017-09-25

## 2017-09-25 DIAGNOSIS — E53.8 B12 DEFICIENCY: ICD-10-CM

## 2017-09-25 PROCEDURE — 96372 THER/PROPH/DIAG INJ SC/IM: CPT | Performed by: FAMILY MEDICINE

## 2017-09-25 RX ADMIN — CYANOCOBALAMIN 1000 MCG: 1000 INJECTION, SOLUTION INTRAMUSCULAR; SUBCUTANEOUS at 14:01

## 2017-10-26 ENCOUNTER — TELEPHONE (OUTPATIENT)
Dept: FAMILY MEDICINE CLINIC | Facility: CLINIC | Age: 82
End: 2017-10-26

## 2017-10-26 NOTE — TELEPHONE ENCOUNTER
----- Message from KIRSTEN Martinez sent at 10/26/2017  8:15 AM EDT -----  Normal mammo, repeat 1 yr

## 2017-10-27 ENCOUNTER — CLINICAL SUPPORT (OUTPATIENT)
Dept: FAMILY MEDICINE CLINIC | Facility: CLINIC | Age: 82
End: 2017-10-27

## 2017-10-27 DIAGNOSIS — E53.8 B12 DEFICIENCY: ICD-10-CM

## 2017-10-27 PROCEDURE — 96372 THER/PROPH/DIAG INJ SC/IM: CPT | Performed by: FAMILY MEDICINE

## 2017-10-27 RX ADMIN — CYANOCOBALAMIN 1000 MCG: 1000 INJECTION, SOLUTION INTRAMUSCULAR; SUBCUTANEOUS at 14:06

## 2017-11-27 ENCOUNTER — CLINICAL SUPPORT (OUTPATIENT)
Dept: FAMILY MEDICINE CLINIC | Facility: CLINIC | Age: 82
End: 2017-11-27

## 2017-11-27 DIAGNOSIS — D51.9 ANEMIA DUE TO VITAMIN B12 DEFICIENCY, UNSPECIFIED B12 DEFICIENCY TYPE: ICD-10-CM

## 2017-11-27 PROCEDURE — 96372 THER/PROPH/DIAG INJ SC/IM: CPT | Performed by: FAMILY MEDICINE

## 2017-11-27 RX ADMIN — CYANOCOBALAMIN 1000 MCG: 1000 INJECTION, SOLUTION INTRAMUSCULAR; SUBCUTANEOUS at 14:10

## 2017-12-24 ENCOUNTER — LAB REQUISITION (OUTPATIENT)
Dept: LAB | Facility: HOSPITAL | Age: 82
End: 2017-12-24

## 2017-12-24 DIAGNOSIS — Z00.00 ROUTINE GENERAL MEDICAL EXAMINATION AT A HEALTH CARE FACILITY: ICD-10-CM

## 2017-12-24 LAB
BACTERIA UR QL AUTO: ABNORMAL /HPF
BILIRUB UR QL STRIP: NEGATIVE
CLARITY UR: ABNORMAL
COD CRY URNS QL: ABNORMAL /HPF
COLOR UR: ABNORMAL
GLUCOSE UR STRIP-MCNC: NEGATIVE MG/DL
HGB UR QL STRIP.AUTO: NEGATIVE
HYALINE CASTS UR QL AUTO: ABNORMAL /LPF
KETONES UR QL STRIP: NEGATIVE
LEUKOCYTE ESTERASE UR QL STRIP.AUTO: ABNORMAL
NITRITE UR QL STRIP: NEGATIVE
PH UR STRIP.AUTO: 5.5 [PH] (ref 5–8)
PROT UR QL STRIP: ABNORMAL
RBC # UR: ABNORMAL /HPF
REF LAB TEST METHOD: ABNORMAL
SP GR UR STRIP: >=1.03 (ref 1–1.03)
SQUAMOUS #/AREA URNS HPF: ABNORMAL /HPF
UROBILINOGEN UR QL STRIP: ABNORMAL
WBC UR QL AUTO: ABNORMAL /HPF

## 2017-12-24 PROCEDURE — 81001 URINALYSIS AUTO W/SCOPE: CPT

## 2018-01-12 ENCOUNTER — OFFICE VISIT (OUTPATIENT)
Dept: FAMILY MEDICINE CLINIC | Facility: CLINIC | Age: 83
End: 2018-01-12

## 2018-01-12 VITALS
BODY MASS INDEX: 20.37 KG/M2 | DIASTOLIC BLOOD PRESSURE: 78 MMHG | TEMPERATURE: 98.6 F | SYSTOLIC BLOOD PRESSURE: 124 MMHG | OXYGEN SATURATION: 99 % | HEART RATE: 98 BPM | WEIGHT: 115 LBS

## 2018-01-12 DIAGNOSIS — R68.83 CHILLS: Primary | ICD-10-CM

## 2018-01-12 DIAGNOSIS — J06.9 ACUTE URI: ICD-10-CM

## 2018-01-12 LAB
EXPIRATION DATE: NORMAL
FLUAV AG NPH QL: NEGATIVE
FLUBV AG NPH QL: NEGATIVE
INTERNAL CONTROL: NORMAL
Lab: NORMAL

## 2018-01-12 PROCEDURE — 87804 INFLUENZA ASSAY W/OPTIC: CPT | Performed by: NURSE PRACTITIONER

## 2018-01-12 PROCEDURE — 99213 OFFICE O/P EST LOW 20 MIN: CPT | Performed by: NURSE PRACTITIONER

## 2018-01-12 RX ORDER — BENZONATATE 100 MG/1
100 CAPSULE ORAL 3 TIMES DAILY PRN
Qty: 21 CAPSULE | Refills: 0 | Status: SHIPPED | OUTPATIENT
Start: 2018-01-12 | End: 2018-01-15

## 2018-01-12 RX ORDER — METOPROLOL SUCCINATE 25 MG/1
25 TABLET, EXTENDED RELEASE ORAL DAILY
COMMUNITY
Start: 2018-01-11 | End: 2018-11-30

## 2018-01-12 RX ORDER — FUROSEMIDE 40 MG/1
40 TABLET ORAL DAILY
COMMUNITY
Start: 2017-12-26

## 2018-01-12 NOTE — PROGRESS NOTES
Bita Anderson is a 82 y.o. female who presents c/o cough, congestion, chills, hoarse since last night.     History of Present Illness   Reports symptoms of cough and voice loss, some chills since last night. Mild cough yesterday. Niece with similar symptoms  The following portions of the patient's history were reviewed and updated as appropriate: allergies, current medications, past family history, past medical history, past social history, past surgical history and problem list.    Review of Systems   Constitutional: Negative.    HENT: Positive for congestion, rhinorrhea and voice change. Negative for ear pain and sore throat.    Eyes: Negative.    Respiratory: Positive for cough.    Cardiovascular: Negative.    Gastrointestinal: Negative.    Musculoskeletal: Positive for arthralgias.   Neurological: Negative.    Hematological: Negative.    Psychiatric/Behavioral: Negative.      /78  Pulse 98  Temp 98.6 °F (37 °C) (Oral)   Wt 52.2 kg (115 lb)  SpO2 99%  BMI 20.37 kg/m2    Objective   Physical Exam   Constitutional: She is oriented to person, place, and time.   HENT:   Right Ear: Tympanic membrane normal.   Left Ear: Tympanic membrane normal.   Nose: Mucosal edema and rhinorrhea present.   Mouth/Throat: Uvula is midline and mucous membranes are normal. Posterior oropharyngeal erythema present.   Neck: Normal range of motion. Neck supple. No tracheal deviation present. No thyromegaly present.   Cardiovascular: Normal rate and normal heart sounds.    Pulmonary/Chest: Effort normal and breath sounds normal.   Musculoskeletal: She exhibits no edema.   Lymphadenopathy:     She has no cervical adenopathy.   Neurological: She is alert and oriented to person, place, and time.   Skin: Skin is warm and dry.   Nursing note and vitals reviewed.    Assessment/Plan   Problems Addressed this Visit     None      Visit Diagnoses     Chills    -  Primary    Relevant Orders    POCT Influenza A/B (Completed)     Acute URI            Results for orders placed or performed in visit on 01/12/18   POCT Influenza A/B   Result Value Ref Range    Rapid Influenza A Ag negative     Rapid Influenza B Ag negative     Internal Control Passed Passed    Lot Number [id number]     Expiration Date 05/14/2020      No flu, likely viral URI, reasonable to suppress the cough. Encouraged rest and fluids. Follow up 1 week if cough not resolving.

## 2018-01-15 ENCOUNTER — OFFICE VISIT (OUTPATIENT)
Dept: FAMILY MEDICINE CLINIC | Facility: CLINIC | Age: 83
End: 2018-01-15

## 2018-01-15 VITALS
TEMPERATURE: 98.3 F | WEIGHT: 110 LBS | HEART RATE: 88 BPM | SYSTOLIC BLOOD PRESSURE: 118 MMHG | DIASTOLIC BLOOD PRESSURE: 74 MMHG | OXYGEN SATURATION: 96 % | BODY MASS INDEX: 19.49 KG/M2

## 2018-01-15 DIAGNOSIS — R05.9 COUGH: Primary | ICD-10-CM

## 2018-01-15 DIAGNOSIS — J06.9 ACUTE URI: ICD-10-CM

## 2018-01-15 DIAGNOSIS — E44.0 PROTEIN-CALORIE MALNUTRITION, MODERATE (HCC): ICD-10-CM

## 2018-01-15 DIAGNOSIS — R64 CACHEXIA (HCC): ICD-10-CM

## 2018-01-15 DIAGNOSIS — D51.9 ANEMIA DUE TO VITAMIN B12 DEFICIENCY, UNSPECIFIED B12 DEFICIENCY TYPE: ICD-10-CM

## 2018-01-15 LAB
ALBUMIN SERPL-MCNC: 4 G/DL (ref 3.5–5.2)
ALBUMIN/GLOB SERPL: 1.7 G/DL
ALP SERPL-CCNC: 116 U/L (ref 39–117)
ALT SERPL-CCNC: 10 U/L (ref 1–33)
AST SERPL-CCNC: 25 U/L (ref 1–32)
BASOPHILS # BLD AUTO: 0.01 10*3/MM3 (ref 0–0.2)
BASOPHILS NFR BLD AUTO: 0.2 % (ref 0–1.5)
BILIRUB SERPL-MCNC: 0.7 MG/DL (ref 0.1–1.2)
BUN SERPL-MCNC: 22 MG/DL (ref 8–23)
BUN/CREAT SERPL: 26.5 (ref 7–25)
CALCIUM SERPL-MCNC: 8.5 MG/DL (ref 8.6–10.5)
CHLORIDE SERPL-SCNC: 91 MMOL/L (ref 98–107)
CO2 SERPL-SCNC: 31 MMOL/L (ref 22–29)
CREAT SERPL-MCNC: 0.83 MG/DL (ref 0.57–1)
EOSINOPHIL # BLD AUTO: 0.04 10*3/MM3 (ref 0–0.7)
EOSINOPHIL NFR BLD AUTO: 1 % (ref 0.3–6.2)
ERYTHROCYTE [DISTWIDTH] IN BLOOD BY AUTOMATED COUNT: 15.7 % (ref 11.7–13)
GLOBULIN SER CALC-MCNC: 2.4 GM/DL
GLUCOSE SERPL-MCNC: 79 MG/DL (ref 65–99)
HCT VFR BLD AUTO: 33.6 % (ref 35.6–45.5)
HGB BLD-MCNC: 10.5 G/DL (ref 11.9–15.5)
IMM GRANULOCYTES # BLD: 0 10*3/MM3 (ref 0–0.03)
IMM GRANULOCYTES NFR BLD: 0 % (ref 0–0.5)
LYMPHOCYTES # BLD AUTO: 1.25 10*3/MM3 (ref 0.9–4.8)
LYMPHOCYTES NFR BLD AUTO: 30.5 % (ref 19.6–45.3)
MCH RBC QN AUTO: 26.1 PG (ref 26.9–32)
MCHC RBC AUTO-ENTMCNC: 31.3 G/DL (ref 32.4–36.3)
MCV RBC AUTO: 83.4 FL (ref 80.5–98.2)
MONOCYTES # BLD AUTO: 0.44 10*3/MM3 (ref 0.2–1.2)
MONOCYTES NFR BLD AUTO: 10.7 % (ref 5–12)
NEUTROPHILS # BLD AUTO: 2.36 10*3/MM3 (ref 1.9–8.1)
NEUTROPHILS NFR BLD AUTO: 57.6 % (ref 42.7–76)
PLATELET # BLD AUTO: 173 10*3/MM3 (ref 140–500)
POTASSIUM SERPL-SCNC: 3.4 MMOL/L (ref 3.5–5.2)
PROT SERPL-MCNC: 6.4 G/DL (ref 6–8.5)
RBC # BLD AUTO: 4.03 10*6/MM3 (ref 3.9–5.2)
SODIUM SERPL-SCNC: 134 MMOL/L (ref 136–145)
TSH SERPL DL<=0.005 MIU/L-ACNC: 1.97 MIU/ML (ref 0.27–4.2)
VIT B12 SERPL-MCNC: 645 PG/ML (ref 211–946)
WBC # BLD AUTO: 4.1 10*3/MM3 (ref 4.5–10.7)

## 2018-01-15 PROCEDURE — 71046 X-RAY EXAM CHEST 2 VIEWS: CPT | Performed by: NURSE PRACTITIONER

## 2018-01-15 PROCEDURE — 99214 OFFICE O/P EST MOD 30 MIN: CPT | Performed by: NURSE PRACTITIONER

## 2018-01-15 RX ORDER — CLOTRIMAZOLE 1 %
CREAM (GRAM) TOPICAL EVERY 12 HOURS SCHEDULED
Qty: 14 G | Refills: 0 | Status: SHIPPED | OUTPATIENT
Start: 2018-01-15 | End: 2019-01-01

## 2018-01-15 NOTE — PROGRESS NOTES
Bita Anderson is a 82 y.o. female who presents for a follow up on URI. Still c/o cough, congestion and hoarseness. Unable to take tessalon as it burned throat.     History of Present Illness   Cough is more productive with creamy sputum, now with full voice loss, and ST. No fever, or chills. Not using tessalon. Or throat lozenges.   The following portions of the patient's history were reviewed and updated as appropriate: allergies, current medications, past family history, past medical history, past social history, past surgical history and problem list.    Review of Systems   Constitutional: Positive for appetite change (less). Negative for activity change, chills and fever.   HENT: Positive for congestion, rhinorrhea, sore throat and voice change. Negative for ear pain.    Eyes: Negative.    Respiratory: Positive for cough.    Cardiovascular: Negative.    Gastrointestinal: Negative.    Musculoskeletal: Positive for arthralgias (hip pain).   Neurological: Negative for headaches.   Hematological: Negative.    Psychiatric/Behavioral: Negative.      /74  Pulse 88  Temp 98.3 °F (36.8 °C) (Oral)   Wt 49.9 kg (110 lb)  SpO2 96%  BMI 19.49 kg/m2    Objective   Physical Exam   Constitutional: She is oriented to person, place, and time. She appears cachectic.   HENT:   Right Ear: Tympanic membrane normal.   Left Ear: Tympanic membrane normal.   Nose: Mucosal edema and rhinorrhea present. Right sinus exhibits no maxillary sinus tenderness and no frontal sinus tenderness. Left sinus exhibits no maxillary sinus tenderness and no frontal sinus tenderness.   Mouth/Throat: Uvula is midline and mucous membranes are normal. Posterior oropharyngeal erythema present.   Neck: Normal range of motion. Neck supple. No tracheal deviation present. No thyromegaly present.   Cardiovascular: Normal rate.  An irregularly irregular rhythm present.   Pulmonary/Chest: Effort normal and breath sounds normal.    Lymphadenopathy:     She has no cervical adenopathy.   Neurological: She is alert and oriented to person, place, and time.   Skin: Skin is warm and dry.   Psychiatric: Her speech is normal and behavior is normal. Her mood appears anxious (typical).   Nursing note and vitals reviewed.    Assessment/Plan   Problems Addressed this Visit        Digestive    B12 deficiency anemia    Relevant Orders    CBC & Differential    Vitamin B12    Protein-calorie malnutrition, moderate    Relevant Orders    TSH       Other    Cachexia    Relevant Orders    Comprehensive Metabolic Panel    TSH      Other Visit Diagnoses     Cough    -  Primary    Relevant Orders    XR Chest PA & Lateral (Completed)    Acute URI            CXR with NAD, will send to radiology for opinion, nodules noted to CXR, documented  on CT 6/14/17, follow up if cough not settling 1 week. Favor still viral etiology with full voice loss. Update labs for cachexia. Encouraged to increase calorie intake. Symptom treatment. FU 3 months. FU ASAP if any worsening.   Rehab as scheduled per ortho.

## 2018-01-16 ENCOUNTER — TELEPHONE (OUTPATIENT)
Dept: FAMILY MEDICINE CLINIC | Facility: CLINIC | Age: 83
End: 2018-01-16

## 2018-01-16 DIAGNOSIS — D64.9 ANEMIA, UNSPECIFIED TYPE: ICD-10-CM

## 2018-01-16 DIAGNOSIS — E87.6 LOW SERUM POTASSIUM: Primary | ICD-10-CM

## 2018-01-16 RX ORDER — POTASSIUM CHLORIDE 1.5 G/1.77G
POWDER, FOR SOLUTION ORAL
Qty: 30 PACKET | Refills: 3 | Status: SHIPPED | OUTPATIENT
Start: 2018-01-16 | End: 2018-06-08

## 2018-01-16 NOTE — PROGRESS NOTES
Please call the patient regarding her abnormal result.mild anemia, likely due to fall and blood loss of surgery, recheck 1 month. Potassium is low. Need add potassium 20meq daily #30 3rf, recheck bmp with cbc 1 month

## 2018-01-16 NOTE — TELEPHONE ENCOUNTER
Patient wants to know she should expect her voice to return? Was told its hard to say and needs more time to clear up but did not accept this answer. Wants to hear from Tamera.

## 2018-01-16 NOTE — TELEPHONE ENCOUNTER
----- Message from KIRSTEN Martinez sent at 1/16/2018  8:18 AM EST -----  Please call the patient regarding her abnormal result.mild anemia, likely due to fall and blood loss of surgery, recheck 1 month. Potassium is low. Need add potassium 20meq daily #30 3rf, recheck bmp with cbc 1 month

## 2018-01-29 ENCOUNTER — OFFICE VISIT (OUTPATIENT)
Dept: FAMILY MEDICINE CLINIC | Facility: CLINIC | Age: 83
End: 2018-01-29

## 2018-01-29 VITALS
DIASTOLIC BLOOD PRESSURE: 72 MMHG | BODY MASS INDEX: 19.31 KG/M2 | TEMPERATURE: 97.6 F | WEIGHT: 109 LBS | OXYGEN SATURATION: 98 % | HEART RATE: 77 BPM | SYSTOLIC BLOOD PRESSURE: 116 MMHG

## 2018-01-29 DIAGNOSIS — R05.9 COUGH: Primary | ICD-10-CM

## 2018-01-29 DIAGNOSIS — D51.9 ANEMIA DUE TO VITAMIN B12 DEFICIENCY, UNSPECIFIED B12 DEFICIENCY TYPE: ICD-10-CM

## 2018-01-29 PROCEDURE — 96372 THER/PROPH/DIAG INJ SC/IM: CPT | Performed by: NURSE PRACTITIONER

## 2018-01-29 PROCEDURE — 99213 OFFICE O/P EST LOW 20 MIN: CPT | Performed by: NURSE PRACTITIONER

## 2018-01-29 PROCEDURE — 71046 X-RAY EXAM CHEST 2 VIEWS: CPT | Performed by: NURSE PRACTITIONER

## 2018-01-29 RX ORDER — POTASSIUM CHLORIDE 1500 MG/1
TABLET, FILM COATED, EXTENDED RELEASE ORAL
COMMUNITY
Start: 2018-01-16 | End: 2018-06-08 | Stop reason: ALTCHOICE

## 2018-01-29 RX ADMIN — CYANOCOBALAMIN 1000 MCG: 1000 INJECTION, SOLUTION INTRAMUSCULAR; SUBCUTANEOUS at 14:38

## 2018-01-29 NOTE — PROGRESS NOTES
Bita Anderson is a 82 y.o. female who presents for a follow up on cough. Still coughing, hoarse and weak. Due for repeat CXR.     History of Present Illness   Due for nipple marker films, completed today. Reports cough is not much improved. Increased complaints with difficulty breathing at night. Plans to restart rehab on the hip.   The following portions of the patient's history were reviewed and updated as appropriate: allergies, current medications, past family history, past medical history, past social history, past surgical history and problem list.    Review of Systems   Constitutional: Negative for chills and fever.   HENT: Positive for sore throat and voice change. Negative for ear pain and rhinorrhea.    Respiratory: Positive for shortness of breath. Negative for cough and wheezing.    Cardiovascular: Negative.  Negative for leg swelling.   Gastrointestinal: Negative.    Musculoskeletal: Negative.    Neurological: Negative for headaches.   Hematological: Negative.    Psychiatric/Behavioral: Positive for behavioral problems.     /72  Pulse 77  Temp 97.6 °F (36.4 °C) (Oral)   Wt 49.4 kg (109 lb)  SpO2 98%  BMI 19.31 kg/m2    Objective   Physical Exam   Constitutional: She is oriented to person, place, and time. She appears well-developed and well-nourished. No distress.   Neck: Normal range of motion. Neck supple. No tracheal deviation present. No thyromegaly present.   Cardiovascular: Normal rate, regular rhythm and normal heart sounds.    Pulmonary/Chest: Effort normal and breath sounds normal.   Lymphadenopathy:     She has no cervical adenopathy.   Neurological: She is alert and oriented to person, place, and time.   Skin: Skin is warm and dry. She is not diaphoretic.   Psychiatric: She has a normal mood and affect. Judgment and thought content normal.   Nursing note and vitals reviewed.    Assessment/Plan   Problems Addressed this Visit        Digestive    B12 deficiency anemia       Other Visit Diagnoses     Cough    -  Primary    Relevant Orders    XR Chest PA & Lateral (In Office) (Completed)        CXR with nipple markers, no noted changes from 1/15/18 cxr, will send to radiology for opinion  Can try chloraseptic for throat irritation.   Post viral cough--continue to hold further treatment, should continue to settle.   Looks euvolemic today, no change to lasix.

## 2018-02-21 ENCOUNTER — RESULTS ENCOUNTER (OUTPATIENT)
Dept: FAMILY MEDICINE CLINIC | Facility: CLINIC | Age: 83
End: 2018-02-21

## 2018-02-21 DIAGNOSIS — D64.9 ANEMIA, UNSPECIFIED TYPE: ICD-10-CM

## 2018-02-21 DIAGNOSIS — E87.6 LOW SERUM POTASSIUM: ICD-10-CM

## 2018-02-26 ENCOUNTER — CLINICAL SUPPORT (OUTPATIENT)
Dept: FAMILY MEDICINE CLINIC | Facility: CLINIC | Age: 83
End: 2018-02-26

## 2018-02-26 DIAGNOSIS — E53.8 B12 DEFICIENCY: ICD-10-CM

## 2018-02-26 PROCEDURE — 96372 THER/PROPH/DIAG INJ SC/IM: CPT | Performed by: FAMILY MEDICINE

## 2018-02-26 RX ADMIN — CYANOCOBALAMIN 1000 MCG: 1000 INJECTION, SOLUTION INTRAMUSCULAR; SUBCUTANEOUS at 13:52

## 2018-02-27 ENCOUNTER — TELEPHONE (OUTPATIENT)
Dept: FAMILY MEDICINE CLINIC | Facility: CLINIC | Age: 83
End: 2018-02-27

## 2018-02-27 LAB
BASOPHILS # BLD AUTO: 0 X10E3/UL (ref 0–0.2)
BASOPHILS NFR BLD AUTO: 0 %
BUN SERPL-MCNC: 20 MG/DL (ref 8–27)
BUN/CREAT SERPL: 28 (ref 12–28)
CALCIUM SERPL-MCNC: 8.8 MG/DL (ref 8.7–10.3)
CHLORIDE SERPL-SCNC: 97 MMOL/L (ref 96–106)
CO2 SERPL-SCNC: 25 MMOL/L (ref 18–29)
CREAT SERPL-MCNC: 0.72 MG/DL (ref 0.57–1)
EOSINOPHIL # BLD AUTO: 0.1 X10E3/UL (ref 0–0.4)
EOSINOPHIL NFR BLD AUTO: 2 %
ERYTHROCYTE [DISTWIDTH] IN BLOOD BY AUTOMATED COUNT: 17.2 % (ref 12.3–15.4)
GFR SERPLBLD CREATININE-BSD FMLA CKD-EPI: 78 ML/MIN/1.73
GFR SERPLBLD CREATININE-BSD FMLA CKD-EPI: 90 ML/MIN/1.73
GLUCOSE SERPL-MCNC: 79 MG/DL (ref 65–99)
HCT VFR BLD AUTO: 29.3 % (ref 34–46.6)
HGB BLD-MCNC: 9.4 G/DL (ref 11.1–15.9)
IMM GRANULOCYTES # BLD: 0 X10E3/UL (ref 0–0.1)
IMM GRANULOCYTES NFR BLD: 0 %
LYMPHOCYTES # BLD AUTO: 1.8 X10E3/UL (ref 0.7–3.1)
LYMPHOCYTES NFR BLD AUTO: 39 %
MCH RBC QN AUTO: 25.2 PG (ref 26.6–33)
MCHC RBC AUTO-ENTMCNC: 32.1 G/DL (ref 31.5–35.7)
MCV RBC AUTO: 79 FL (ref 79–97)
MONOCYTES # BLD AUTO: 0.4 X10E3/UL (ref 0.1–0.9)
MONOCYTES NFR BLD AUTO: 9 %
NEUTROPHILS # BLD AUTO: 2.3 X10E3/UL (ref 1.4–7)
NEUTROPHILS NFR BLD AUTO: 50 %
PLATELET # BLD AUTO: 215 X10E3/UL (ref 150–379)
POTASSIUM SERPL-SCNC: 3.9 MMOL/L (ref 3.5–5.2)
RBC # BLD AUTO: 3.73 X10E6/UL (ref 3.77–5.28)
SODIUM SERPL-SCNC: 139 MMOL/L (ref 134–144)
WBC # BLD AUTO: 4.6 X10E3/UL (ref 3.4–10.8)

## 2018-02-27 RX ORDER — FERROUS SULFATE 325(65) MG
325 TABLET ORAL
Qty: 30 TABLET | Refills: 3 | Status: SHIPPED | OUTPATIENT
Start: 2018-02-27 | End: 2019-01-01

## 2018-02-27 NOTE — TELEPHONE ENCOUNTER
pts daughter informed, rx sent to [facility]. Pt is upset about taking another medication and would like to set appt to discuss. appt was scheduled. Copy was faxed to Dr Bala Wei per pts request.

## 2018-02-27 NOTE — TELEPHONE ENCOUNTER
----- Message from KIRSTEN Martinez sent at 2/27/2018  8:11 AM EST -----  Please call the patient regarding her abnormal result. Potassium is corrected, continue replacement; still anemic, recommend FOBT x 3, start iron sulfate 325mg 1 po qd #30 3rf to address anemia.

## 2018-02-27 NOTE — PROGRESS NOTES
Please call the patient regarding her abnormal result. Potassium is corrected, continue replacement; still anemic, recommend FOBT x 3, start iron sulfate 325mg 1 po qd #30 3rf to address anemia.

## 2018-03-05 ENCOUNTER — OFFICE VISIT (OUTPATIENT)
Dept: FAMILY MEDICINE CLINIC | Facility: CLINIC | Age: 83
End: 2018-03-05

## 2018-03-05 VITALS
BODY MASS INDEX: 20.73 KG/M2 | WEIGHT: 117 LBS | TEMPERATURE: 97.8 F | HEART RATE: 77 BPM | DIASTOLIC BLOOD PRESSURE: 78 MMHG | SYSTOLIC BLOOD PRESSURE: 128 MMHG | OXYGEN SATURATION: 99 %

## 2018-03-05 DIAGNOSIS — S81.812A LACERATION OF SKIN OF LEFT LOWER LEG, INITIAL ENCOUNTER: ICD-10-CM

## 2018-03-05 DIAGNOSIS — E44.0 PROTEIN-CALORIE MALNUTRITION, MODERATE (HCC): ICD-10-CM

## 2018-03-05 DIAGNOSIS — D50.0 IRON DEFICIENCY ANEMIA DUE TO CHRONIC BLOOD LOSS: Primary | ICD-10-CM

## 2018-03-05 PROCEDURE — 99214 OFFICE O/P EST MOD 30 MIN: CPT | Performed by: NURSE PRACTITIONER

## 2018-03-05 NOTE — PROGRESS NOTES
Subjective   Chiquita Anderson is a 82 y.o. female who presents c/o cut to right lower leg x 4 days. Also wants to review lab results.     History of Present Illness   L shin laceration. Lacerated on walker. No falls.   Worried about having to take iron and lasix, but taking both with no side effects, no constipation or nausea. No sign of bleeding beyond recent skin laceration to shin.   Some pains to R buttock, does have DDD. Denies radicular pain  The following portions of the patient's history were reviewed and updated as appropriate: allergies, current medications, past family history, past medical history, past social history, past surgical history and problem list.    Review of Systems   Constitutional: Positive for activity change and fatigue. Negative for fever.   Respiratory: Negative for chest tightness and shortness of breath.    Cardiovascular: Negative for chest pain and leg swelling.   Gastrointestinal: Negative for constipation (no bowel or bladder dysfunction) and diarrhea.   Genitourinary: Negative for difficulty urinating.   Musculoskeletal: Positive for back pain and gait problem. Negative for arthralgias, joint swelling and myalgias.   Skin: Positive for wound. Negative for color change.   Neurological: Negative for weakness (leg) and headaches.   Hematological: Negative for adenopathy. Bruises/bleeds easily (anticoagulated on warfarin).   Psychiatric/Behavioral: Positive for sleep disturbance. The patient is nervous/anxious.      /78  Pulse 77  Temp 97.8 °F (36.6 °C) (Oral)   Wt 53.1 kg (117 lb)  SpO2 99%  BMI 20.73 kg/m2    Objective   Physical Exam   Constitutional: She is oriented to person, place, and time. She appears well-developed and well-nourished.   Neck: Normal range of motion. Neck supple. No tracheal deviation present. No thyromegaly present.   Cardiovascular: Normal rate.  An irregular rhythm present.   Pulmonary/Chest: Effort normal and breath sounds normal.    Musculoskeletal: She exhibits no edema.        Right hip: She exhibits tenderness (hematoma).   +R facet loading    Lymphadenopathy:     She has no cervical adenopathy.   Neurological: She is alert and oriented to person, place, and time.   Skin: Skin is warm and dry.   L shin with 2 cm shallow laceration, edges not well approximated, no bleeding   Psychiatric: Her speech is normal. Her mood appears anxious.   Nursing note and vitals reviewed.    Assessment/Plan   Problems Addressed this Visit     None      Visit Diagnoses     Iron deficiency anemia due to chronic blood loss    -  Primary    Relevant Orders    Basic Metabolic Panel    CBC (No Diff)    Laceration of skin of left lower leg, initial encounter            If needs stool softener would recommend colace.     Weight up today. Is working to increase potassium and protein in the diet. Check BMP and CBC 1 month.      euvolemic today, no sign of CHF.     Recommend ice or heat to R buttock. Call if any radicular signs (leg pain--has done well with FRANDY in the past)    Change dressing to skin lac daily, wash with soap and water pat dry, apply antibiotic ointment, cover with nonstick bandaid or pad, paper tape.

## 2018-03-26 ENCOUNTER — CLINICAL SUPPORT (OUTPATIENT)
Dept: FAMILY MEDICINE CLINIC | Facility: CLINIC | Age: 83
End: 2018-03-26

## 2018-03-26 DIAGNOSIS — E53.8 B12 DEFICIENCY: ICD-10-CM

## 2018-03-26 PROCEDURE — 96372 THER/PROPH/DIAG INJ SC/IM: CPT | Performed by: FAMILY MEDICINE

## 2018-03-26 RX ADMIN — CYANOCOBALAMIN 1000 MCG: 1000 INJECTION, SOLUTION INTRAMUSCULAR; SUBCUTANEOUS at 14:02

## 2018-03-27 LAB
BUN SERPL-MCNC: 24 MG/DL (ref 8–27)
BUN/CREAT SERPL: 29 (ref 12–28)
CALCIUM SERPL-MCNC: 8.8 MG/DL (ref 8.7–10.3)
CHLORIDE SERPL-SCNC: 102 MMOL/L (ref 96–106)
CO2 SERPL-SCNC: 25 MMOL/L (ref 18–29)
CREAT SERPL-MCNC: 0.83 MG/DL (ref 0.57–1)
ERYTHROCYTE [DISTWIDTH] IN BLOOD BY AUTOMATED COUNT: 16.9 % (ref 12.3–15.4)
GFR SERPLBLD CREATININE-BSD FMLA CKD-EPI: 66 ML/MIN/1.73
GFR SERPLBLD CREATININE-BSD FMLA CKD-EPI: 76 ML/MIN/1.73
GLUCOSE SERPL-MCNC: 91 MG/DL (ref 65–99)
HCT VFR BLD AUTO: 29.9 % (ref 34–46.6)
HGB BLD-MCNC: 9.7 G/DL (ref 11.1–15.9)
MCH RBC QN AUTO: 26.1 PG (ref 26.6–33)
MCHC RBC AUTO-ENTMCNC: 32.4 G/DL (ref 31.5–35.7)
MCV RBC AUTO: 80 FL (ref 79–97)
PLATELET # BLD AUTO: 196 X10E3/UL (ref 150–379)
POTASSIUM SERPL-SCNC: 4.7 MMOL/L (ref 3.5–5.2)
RBC # BLD AUTO: 3.72 X10E6/UL (ref 3.77–5.28)
SODIUM SERPL-SCNC: 141 MMOL/L (ref 134–144)
WBC # BLD AUTO: 4.7 X10E3/UL (ref 3.4–10.8)

## 2018-03-28 ENCOUNTER — TELEPHONE (OUTPATIENT)
Dept: FAMILY MEDICINE CLINIC | Facility: CLINIC | Age: 83
End: 2018-03-28

## 2018-03-28 LAB
FERRITIN SERPL-MCNC: 45 NG/ML (ref 15–150)
IRON SATN MFR SERPL: 26 % (ref 15–55)
IRON SERPL-MCNC: 95 UG/DL (ref 27–139)
Lab: NORMAL
TIBC SERPL-MCNC: 363 UG/DL (ref 250–450)
UIBC SERPL-MCNC: 268 UG/DL (ref 118–369)
WRITTEN AUTHORIZATION: NORMAL

## 2018-03-28 NOTE — TELEPHONE ENCOUNTER
Pts daughter informed, will talk with pt regarding FOBT. Daughter wants to know how long pt will need to continue taking extra potassium pill? Was told by wound care nurse that taking potassium long term can damage liver and pt does not want to take it now.

## 2018-03-28 NOTE — TELEPHONE ENCOUNTER
----- Message from KIRSTEN Martinez sent at 3/28/2018  8:46 AM EDT -----  Please call the patient regarding her result. Anemia (low blood counts) and iron stores slowly improving, recommend FOBT x3 to assess for hidden blood loss. FU 3 months.

## 2018-03-28 NOTE — PROGRESS NOTES
Please call the patient regarding her result. Anemia (low blood counts) and iron stores slowly improving, recommend FOBT x3 to assess for hidden blood loss. FU 3 months.

## 2018-03-30 ENCOUNTER — OFFICE VISIT (OUTPATIENT)
Dept: FAMILY MEDICINE CLINIC | Facility: CLINIC | Age: 83
End: 2018-03-30

## 2018-03-30 VITALS
WEIGHT: 125 LBS | DIASTOLIC BLOOD PRESSURE: 72 MMHG | BODY MASS INDEX: 22.14 KG/M2 | TEMPERATURE: 98.2 F | SYSTOLIC BLOOD PRESSURE: 128 MMHG | HEART RATE: 68 BPM | OXYGEN SATURATION: 93 %

## 2018-03-30 DIAGNOSIS — D51.9 ANEMIA DUE TO VITAMIN B12 DEFICIENCY, UNSPECIFIED B12 DEFICIENCY TYPE: Primary | ICD-10-CM

## 2018-03-30 DIAGNOSIS — I50.22 CHRONIC SYSTOLIC CONGESTIVE HEART FAILURE (HCC): ICD-10-CM

## 2018-03-30 DIAGNOSIS — N89.8 VAGINAL LESION: ICD-10-CM

## 2018-03-30 PROBLEM — I50.9 CHF (CONGESTIVE HEART FAILURE) (HCC): Status: ACTIVE | Noted: 2018-03-30

## 2018-03-30 PROCEDURE — 99214 OFFICE O/P EST MOD 30 MIN: CPT | Performed by: NURSE PRACTITIONER

## 2018-03-30 NOTE — PROGRESS NOTES
Bita Anderson is a 82 y.o. female who presents c/o possible skin tag near vagina. Also wants to discuss the FOBT and potassium.     History of Present Illness   To be seen at heart failure clinic next week. Having more issues with leg swelling affecting mobility. Wearing compression. Having hesitation regarding doing FOBT    New problem to examiner: Has noticed 3 bumps to the private area that is concerned about. Present for unsure how long. Do not itch or hurt. No associated vaginal discharge. History of uterine cancer, hysterectomy years ago.   The following portions of the patient's history were reviewed and updated as appropriate: allergies, current medications, past family history, past medical history, past social history, past surgical history and problem list.    Review of Systems   Constitutional: Negative.    HENT: Negative.    Respiratory: Negative for cough and shortness of breath.    Cardiovascular: Positive for leg swelling. Negative for chest pain and palpitations.   Genitourinary: Positive for genital sores.   Musculoskeletal: Negative.    Neurological: Negative.    Hematological: Negative.      /72   Pulse 68   Temp 98.2 °F (36.8 °C) (Oral)   Wt 56.7 kg (125 lb)   SpO2 93%   BMI 22.14 kg/m²     Objective   Physical Exam   Constitutional: She appears well-developed and well-nourished.   Neck: Normal range of motion. Neck supple. No tracheal deviation present. No thyromegaly present.   Cardiovascular: Normal rate, normal heart sounds and intact distal pulses.  An irregularly irregular rhythm present.   Pulmonary/Chest: Effort normal and breath sounds normal.   Genitourinary: There is no tenderness or lesion on the right labia. There is no tenderness or lesion on the left labia.   Musculoskeletal: She exhibits edema (1+ pitting to unique LE, to knees).   Lymphadenopathy:     She has no cervical adenopathy.   Skin: Skin is warm and dry.   Psychiatric: She has a normal mood and affect.  Her behavior is normal. Judgment and thought content normal.   Nursing note and vitals reviewed.      Assessment/Plan   Problems Addressed this Visit        Cardiovascular and Mediastinum    CHF (congestive heart failure)       Digestive    B12 deficiency anemia - Primary      Other Visit Diagnoses     Vaginal lesion            Many questions answered about FOBT, strongly recommended as anemia not recovering as quickly as should be expected postop.   Vagina--exterior exam only, pt points out areas of concern, revealing normal labia bilaterally, no lesions or ulcerations.   CHF--lungs clear today. Continue work with heart failure clinic.

## 2018-04-20 ENCOUNTER — CLINICAL SUPPORT (OUTPATIENT)
Dept: FAMILY MEDICINE CLINIC | Facility: CLINIC | Age: 83
End: 2018-04-20

## 2018-04-20 DIAGNOSIS — D64.9 ANEMIA, UNSPECIFIED TYPE: Primary | ICD-10-CM

## 2018-04-20 LAB
EXPIRATION DATE 2: NORMAL
EXPIRATION DATE 3: NORMAL
EXPIRATION DATE: NORMAL
GASTROCULT GAST QL: NEGATIVE
HEMOCCULT SP2 STL QL: NEGATIVE
HEMOCCULT SP3 STL QL: NEGATIVE
Lab: NORMAL

## 2018-04-20 PROCEDURE — 82274 ASSAY TEST FOR BLOOD FECAL: CPT | Performed by: NURSE PRACTITIONER

## 2018-05-02 ENCOUNTER — CLINICAL SUPPORT (OUTPATIENT)
Dept: FAMILY MEDICINE CLINIC | Facility: CLINIC | Age: 83
End: 2018-05-02

## 2018-05-02 DIAGNOSIS — E53.8 B12 DEFICIENCY: ICD-10-CM

## 2018-05-02 PROCEDURE — 96372 THER/PROPH/DIAG INJ SC/IM: CPT | Performed by: FAMILY MEDICINE

## 2018-05-02 RX ADMIN — CYANOCOBALAMIN 1000 MCG: 1000 INJECTION, SOLUTION INTRAMUSCULAR; SUBCUTANEOUS at 13:56

## 2018-05-08 ENCOUNTER — TELEPHONE (OUTPATIENT)
Dept: FAMILY MEDICINE CLINIC | Facility: CLINIC | Age: 83
End: 2018-05-08

## 2018-06-08 ENCOUNTER — OFFICE VISIT (OUTPATIENT)
Dept: FAMILY MEDICINE CLINIC | Facility: CLINIC | Age: 83
End: 2018-06-08

## 2018-06-08 VITALS
HEART RATE: 76 BPM | WEIGHT: 110 LBS | DIASTOLIC BLOOD PRESSURE: 64 MMHG | SYSTOLIC BLOOD PRESSURE: 118 MMHG | TEMPERATURE: 98.7 F | OXYGEN SATURATION: 98 % | BODY MASS INDEX: 19.49 KG/M2

## 2018-06-08 DIAGNOSIS — J30.2 ACUTE SEASONAL ALLERGIC RHINITIS, UNSPECIFIED TRIGGER: Primary | ICD-10-CM

## 2018-06-08 PROCEDURE — 99213 OFFICE O/P EST LOW 20 MIN: CPT | Performed by: NURSE PRACTITIONER

## 2018-06-08 RX ORDER — POTASSIUM CHLORIDE 750 MG/1
20 TABLET, FILM COATED, EXTENDED RELEASE ORAL DAILY
Qty: 60 TABLET | Refills: 0 | Status: SHIPPED | OUTPATIENT
Start: 2018-06-08 | End: 2018-07-03 | Stop reason: SDUPTHER

## 2018-06-08 RX ORDER — FLUTICASONE PROPIONATE 50 MCG
2 SPRAY, SUSPENSION (ML) NASAL DAILY
Qty: 15.8 ML | Refills: 0 | Status: SHIPPED | OUTPATIENT
Start: 2018-06-08 | End: 2018-11-30

## 2018-06-08 NOTE — PROGRESS NOTES
Subjective   Chiquita Anderson is a 82 y.o. female who presents c/o drainage, cough, congestion, runny nose x 1 week.     History of Present Illness   Nasal congestion and throat irritation. Thinks allergies. Doing daily weights for the heart failure clinic, daughter is monitoring lasix intake. Back to wearing compression for the lower extremities.     Appetite has been poor since cold.   The following portions of the patient's history were reviewed and updated as appropriate: allergies, current medications, past family history, past medical history, past social history, past surgical history and problem list.    Review of Systems   Constitutional: Negative for appetite change, unexpected weight gain and unexpected weight loss.   HENT: Positive for congestion, postnasal drip and rhinorrhea. Negative for sinus pressure.    Respiratory: Positive for cough. Negative for shortness of breath.    Cardiovascular: Negative for chest pain, palpitations and leg swelling.   Gastrointestinal: Negative.    Endocrine: Negative.    Genitourinary: Negative.    Musculoskeletal: Positive for arthralgias and back pain.   Allergic/Immunologic: Positive for environmental allergies.   Neurological: Positive for confusion.   Hematological: Negative.    Psychiatric/Behavioral: Positive for decreased concentration and sleep disturbance.     /64   Pulse 76   Temp 98.7 °F (37.1 °C) (Oral)   Wt 49.9 kg (110 lb)   SpO2 98%   BMI 19.49 kg/m²     Objective   Physical Exam   Constitutional: She appears well-developed and well-nourished.   HENT:   Right Ear: Tympanic membrane normal.   Left Ear: Tympanic membrane normal.   Nose: Mucosal edema and rhinorrhea present. Right sinus exhibits no maxillary sinus tenderness and no frontal sinus tenderness. Left sinus exhibits no maxillary sinus tenderness and no frontal sinus tenderness.   Mouth/Throat: Posterior oropharyngeal erythema present. No oropharyngeal exudate.   Cardiovascular: Intact distal  pulses and normal pulses.  An irregularly irregular rhythm present.   Pulmonary/Chest: Effort normal and breath sounds normal.   Psychiatric: Her speech is normal and behavior is normal. Judgment normal. Her mood appears anxious. Cognition and memory are normal. She exhibits a depressed mood.   Nursing note and vitals reviewed.    Assessment/Plan   Problems Addressed this Visit        Respiratory    Atopic rhinitis - Primary    Relevant Medications    fluticasone (FLONASE) 50 MCG/ACT nasal spray        Rotate to 10mEq tabs, easier to swallow. Continue work with heart failure clinic.  Start flonase to address nasal congestion. FU if not settling 2 weeks.   Continue to work to get more rest and eat a healthy balanced diet.   FU 3 weeks. Will recheck labs at that time.

## 2018-06-29 ENCOUNTER — OFFICE VISIT (OUTPATIENT)
Dept: FAMILY MEDICINE CLINIC | Facility: CLINIC | Age: 83
End: 2018-06-29

## 2018-06-29 VITALS
DIASTOLIC BLOOD PRESSURE: 78 MMHG | BODY MASS INDEX: 19.13 KG/M2 | SYSTOLIC BLOOD PRESSURE: 122 MMHG | OXYGEN SATURATION: 98 % | TEMPERATURE: 97.9 F | HEART RATE: 90 BPM | WEIGHT: 108 LBS

## 2018-06-29 DIAGNOSIS — R64 CACHEXIA (HCC): ICD-10-CM

## 2018-06-29 DIAGNOSIS — F51.01 PRIMARY INSOMNIA: ICD-10-CM

## 2018-06-29 DIAGNOSIS — D51.9 ANEMIA DUE TO VITAMIN B12 DEFICIENCY, UNSPECIFIED B12 DEFICIENCY TYPE: Primary | ICD-10-CM

## 2018-06-29 PROCEDURE — 99213 OFFICE O/P EST LOW 20 MIN: CPT | Performed by: NURSE PRACTITIONER

## 2018-06-29 PROCEDURE — 96372 THER/PROPH/DIAG INJ SC/IM: CPT | Performed by: NURSE PRACTITIONER

## 2018-06-29 RX ADMIN — CYANOCOBALAMIN 1000 MCG: 1000 INJECTION, SOLUTION INTRAMUSCULAR; SUBCUTANEOUS at 15:03

## 2018-06-29 NOTE — PROGRESS NOTES
Bita Anderson is a 83 y.o. female who presents for a 3 month follow up on anemia.     History of Present Illness   Still losing weight. Reports still having issues with allergies. Reports eating better when daughter cooks, or when goes out. Clothes are getting too loose. Not sleeping well. Still sleeping on the couch. Having some hallucinations due to lack of sleep.   The following portions of the patient's history were reviewed and updated as appropriate: allergies, current medications, past family history, past medical history, past social history, past surgical history and problem list.    Review of Systems   Constitutional: Positive for activity change (gradually increased, released from PT. ).   Respiratory: Negative.    Cardiovascular: Negative.  Negative for leg swelling (resolved with compression, weighing daily, taking extra water pills when needed for weight gain.).   Genitourinary: Negative.    Musculoskeletal: Positive for arthralgias (R hip pain).   Skin: Negative for color change.   Neurological: Negative for dizziness and light-headedness.   Hematological: Negative.    Psychiatric/Behavioral: Positive for behavioral problems, hallucinations, sleep disturbance, depressed mood and stress. Negative for self-injury and suicidal ideas. The patient is not nervous/anxious.      /78   Pulse 90   Temp 97.9 °F (36.6 °C) (Oral)   Wt 49 kg (108 lb)   SpO2 98%   BMI 19.13 kg/m²     Objective   Physical Exam   Constitutional: She appears well-developed and well-nourished.   Cardiovascular: Normal rate and regular rhythm.  Exam reveals no gallop and no friction rub.    No murmur heard.  Pulmonary/Chest: Effort normal and breath sounds normal.   Musculoskeletal: She exhibits no edema.   Skin: Skin is warm and dry.   Psychiatric: Her speech is normal and behavior is normal. Judgment and thought content normal. Her mood appears not anxious. Cognition and memory are normal. She exhibits a depressed  mood.   Nursing note and vitals reviewed.    Assessment/Plan   Problems Addressed this Visit        Digestive    B12 deficiency anemia - Primary    Relevant Orders    CBC & Differential    Vitamin B12       Other    Cachexia      Other Visit Diagnoses     Primary insomnia            Had large hematoma with hip fracture 12/17, since has been persistently anemic. Poor po intake secondary to depression with refusal to treat. FOBT negative. Continues B12 injections. Update CBC b12 today.     Encouraged high protein diet and better rest. Declines mirtazapine today. To call results of blood work to [facility]. Discussed the serious sequelae of lack of sleep. Strongly encouraged to reconsider medication, or consider grief counseling. Declines both today. Here today with daughter Maite.

## 2018-06-30 LAB
BASOPHILS # BLD AUTO: 0 X10E3/UL (ref 0–0.2)
BASOPHILS NFR BLD AUTO: 0 %
EOSINOPHIL # BLD AUTO: 0.1 X10E3/UL (ref 0–0.4)
EOSINOPHIL NFR BLD AUTO: 3 %
ERYTHROCYTE [DISTWIDTH] IN BLOOD BY AUTOMATED COUNT: 17.4 % (ref 12.3–15.4)
HCT VFR BLD AUTO: 33.3 % (ref 34–46.6)
HGB BLD-MCNC: 11.1 G/DL (ref 11.1–15.9)
IMM GRANULOCYTES # BLD: 0 X10E3/UL (ref 0–0.1)
IMM GRANULOCYTES NFR BLD: 0 %
LYMPHOCYTES # BLD AUTO: 1.8 X10E3/UL (ref 0.7–3.1)
LYMPHOCYTES NFR BLD AUTO: 41 %
MCH RBC QN AUTO: 28 PG (ref 26.6–33)
MCHC RBC AUTO-ENTMCNC: 33.3 G/DL (ref 31.5–35.7)
MCV RBC AUTO: 84 FL (ref 79–97)
MONOCYTES # BLD AUTO: 0.4 X10E3/UL (ref 0.1–0.9)
MONOCYTES NFR BLD AUTO: 9 %
NEUTROPHILS # BLD AUTO: 2.1 X10E3/UL (ref 1.4–7)
NEUTROPHILS NFR BLD AUTO: 47 %
PLATELET # BLD AUTO: 180 X10E3/UL (ref 150–379)
RBC # BLD AUTO: 3.96 X10E6/UL (ref 3.77–5.28)
VIT B12 SERPL-MCNC: >2000 PG/ML (ref 232–1245)
WBC # BLD AUTO: 4.5 X10E3/UL (ref 3.4–10.8)

## 2018-07-05 RX ORDER — POTASSIUM CHLORIDE 750 MG/1
TABLET, FILM COATED, EXTENDED RELEASE ORAL
Qty: 60 TABLET | Refills: 2 | Status: SHIPPED | OUTPATIENT
Start: 2018-07-05 | End: 2018-10-16 | Stop reason: SDUPTHER

## 2018-07-25 ENCOUNTER — TELEPHONE (OUTPATIENT)
Dept: FAMILY MEDICINE CLINIC | Facility: CLINIC | Age: 83
End: 2018-07-25

## 2018-07-25 NOTE — TELEPHONE ENCOUNTER
Patient hit right leg on tub this morning and now has a hematoma on calf. She has appt to get this looked at on Friday. She wants to know if she can continue to wear demi hose? If she does not wear them then legs will swell up. She took extra half of water tablet today because she gained 2 lbs from yesterday.

## 2018-07-27 ENCOUNTER — OFFICE VISIT (OUTPATIENT)
Dept: FAMILY MEDICINE CLINIC | Facility: CLINIC | Age: 83
End: 2018-07-27

## 2018-07-27 VITALS
SYSTOLIC BLOOD PRESSURE: 134 MMHG | WEIGHT: 109 LBS | TEMPERATURE: 98.3 F | HEART RATE: 92 BPM | BODY MASS INDEX: 19.31 KG/M2 | DIASTOLIC BLOOD PRESSURE: 76 MMHG | OXYGEN SATURATION: 99 %

## 2018-07-27 DIAGNOSIS — F43.21 COMPLICATED GRIEF: Primary | ICD-10-CM

## 2018-07-27 DIAGNOSIS — R41.89 COGNITIVE CHANGES: ICD-10-CM

## 2018-07-27 DIAGNOSIS — L60.0 INGROWN TOENAIL WITHOUT INFECTION: ICD-10-CM

## 2018-07-27 DIAGNOSIS — E53.8 B12 DEFICIENCY: ICD-10-CM

## 2018-07-27 PROCEDURE — 96372 THER/PROPH/DIAG INJ SC/IM: CPT | Performed by: NURSE PRACTITIONER

## 2018-07-27 PROCEDURE — G0439 PPPS, SUBSEQ VISIT: HCPCS | Performed by: NURSE PRACTITIONER

## 2018-07-27 PROCEDURE — 99214 OFFICE O/P EST MOD 30 MIN: CPT | Performed by: NURSE PRACTITIONER

## 2018-07-27 RX ADMIN — CYANOCOBALAMIN 1000 MCG: 1000 INJECTION, SOLUTION INTRAMUSCULAR; SUBCUTANEOUS at 16:05

## 2018-07-27 NOTE — PROGRESS NOTES
QUICK REFERENCE INFORMATION:  The ABCs of the Annual Wellness Visit    Subsequent Medicare Wellness Visit    HEALTH RISK ASSESSMENT    1935    Recent Hospitalizations:  Recently treated at the following:  Other: [facility].        Current Medical Providers:  Patient Care Team:  Miguel A Wilson DO as PCP - General  Bala Wei MD as Consulting Physician (Cardiology)  Reinaldo Dee MD as Consulting Physician (Pulmonary Disease)  En Smith MD as Consulting Physician (Ophthalmology)        Smoking Status:  History   Smoking Status   • Never Smoker   Smokeless Tobacco   • Never Used       Alcohol Consumption:  History   Alcohol Use No       Depression Screen:   PHQ-2/PHQ-9 Depression Screening 7/27/2018   Little interest or pleasure in doing things 0   Feeling down, depressed, or hopeless 3   Trouble falling or staying asleep, or sleeping too much 2   Feeling tired or having little energy 2   Poor appetite or overeating 0   Feeling bad about yourself - or that you are a failure or have let yourself or your family down 0   Trouble concentrating on things, such as reading the newspaper or watching television 0   Moving or speaking so slowly that other people could have noticed. Or the opposite - being so fidgety or restless that you have been moving around a lot more than usual 0   Thoughts that you would be better off dead, or of hurting yourself in some way 0   Total Score 7   If you checked off any problems, how difficult have these problems made it for you to do your work, take care of things at home, or get along with other people? Somewhat difficult       Health Habits and Functional and Cognitive Screening:  Functional & Cognitive Status 7/27/2018   Do you have difficulty preparing food and eating? Yes   Do you have difficulty bathing yourself, getting dressed or grooming yourself? Yes   Do you have difficulty using the toilet? No   Do you have difficulty moving around  from place to place? Yes   Do you have trouble with steps or getting out of a bed or a chair? No   In the past year have you fallen or experienced a near fall? Yes   Current Diet Well Balanced Diet   Dental Exam Up to date   Eye Exam Up to date   Exercise (times per week) 0 times per week   Current Exercise Activities Include None   Do you need help using the phone?  No   Are you deaf or do you have serious difficulty hearing?  No   Do you need help with transportation? Yes   Do you need help shopping? Yes   Do you need help preparing meals?  Yes   Do you need help with housework?  Yes   Do you need help with laundry? Yes   Do you need help taking your medications? No   Do you need help managing money? No   Do you ever drive or ride in a car without wearing a seat belt? No   Have you felt unusual stress, anger or loneliness in the last month? Yes   Who do you live with? Alone   If you need help, do you have trouble finding someone available to you? No   Have you been bothered in the last four weeks by sexual problems? No   Do you have difficulty concentrating, remembering or making decisions? Yes           Does the patient have evidence of cognitive impairment? Yes    Aspirin use counseling: Does not need ASA (and currently is not on it)      Recent Lab Results:  CMP:  Lab Results   Component Value Date    GLU 91 03/26/2018    BUN 24 03/26/2018    CREATININE 0.83 03/26/2018    EGFRIFNONA 66 03/26/2018    EGFRIFAFRI 76 03/26/2018    BCR 29 (H) 03/26/2018     03/26/2018    K 4.7 03/26/2018    CO2 25 03/26/2018    CALCIUM 8.8 03/26/2018    PROTENTOTREF 6.4 01/15/2018    ALBUMIN 4.00 01/15/2018    LABGLOBREF 2.4 01/15/2018    LABIL2 1.7 01/15/2018    BILITOT 0.7 01/15/2018    ALKPHOS 116 01/15/2018    AST 25 01/15/2018    ALT 10 01/15/2018     Lipid Panel:  Lab Results   Component Value Date    TRIG 55 08/28/2017    HDL 74 08/28/2017    VLDL 11 08/28/2017     HbA1c:       Visual Acuity:  No exam data  present    Age-appropriate Screening Schedule:  Refer to the list below for future screening recommendations based on patient's age, sex and/or medical conditions. Orders for these recommended tests are listed in the plan section. The patient has been provided with a written plan.    Health Maintenance   Topic Date Due   • ZOSTER VACCINE (2 of 2) 03/08/2017   • PNEUMOCOCCAL VACCINES (65+ LOW/MEDIUM RISK) (2 of 2 - PPSV23) 01/11/2018   • INFLUENZA VACCINE  08/01/2018   • LIPID PANEL  08/28/2018   • DXA SCAN  01/11/2019   • MAMMOGRAM  10/24/2019   • TDAP/TD VACCINES (2 - Td) 01/11/2027        Subjective   History of Present Illness    Chiquita Anderson is a 83 y.o. female who presents for an Subsequent Wellness Visit.    The following portions of the patient's history were reviewed and updated as appropriate: allergies, current medications, past family history, past medical history, past social history, past surgical history and problem list.    Outpatient Medications Prior to Visit   Medication Sig Dispense Refill   • Cholecalciferol (VITAMIN D) 1000 UNITS capsule Take 1 capsule by mouth daily.     • clotrimazole (LOTRIMIN) 1 % cream Apply  topically Every 12 (Twelve) Hours. 14 g 0   • ferrous sulfate 325 (65 FE) MG tablet Take 1 tablet by mouth Daily With Breakfast. 30 tablet 3   • fluticasone (FLONASE) 50 MCG/ACT nasal spray 2 sprays into each nostril Daily. 15.8 mL 0   • furosemide (LASIX) 40 MG tablet Take 40 mg by mouth.     • metoprolol succinate XL (TOPROL-XL) 25 MG 24 hr tablet      • Multiple Vitamins-Minerals (MULTIVITAL PO) Take  by mouth.     • pantoprazole (PROTONIX) 40 MG EC tablet Take 1 tablet by mouth 2 (two) times a day.     • potassium chloride (K-DUR) 10 MEQ CR tablet TAKE TWO TABLETS BY MOUTH DAILY 60 tablet 2   • warfarin (COUMADIN) 5 MG tablet Take by mouth. Take as directed       Facility-Administered Medications Prior to Visit   Medication Dose Route Frequency Provider Last Rate Last Dose   •  cyanocobalamin injection 1,000 mcg  1,000 mcg Intramuscular Q30 Days KIRSTEN Martinez   1,000 mcg at 06/29/18 1503       Patient Active Problem List   Diagnosis   • Abnormal computerized tomography of brain   • Atopic rhinitis   • B12 deficiency anemia   • Cervical radiculopathy   • Familial hypercholesterolemia   • Headache   • Unilateral otalgia   • Metatarsalgia   • Tinnitus   • Vertigo   • B12 deficiency   • Vitamin D deficiency   • Cachexia (CMS/HCC)   • Weight loss, abnormal   • Gait abnormality   • Complicated grief   • Paroxysmal atrial fibrillation (CMS/HCC)   • History of uterine cancer   • Gastroesophageal reflux disease   • Long term current use of anticoagulant   • Hyperlipidemia   • Malignant neoplasm of uterus (CMS/HCC)   • Valvular disease   • Protein-calorie malnutrition, moderate (CMS/HCC)   • Permanent atrial fibrillation (CMS/HCC)   • CHF (congestive heart failure) (CMS/HCC)       Advance Care Planning:  has NO advance directive - information provided to the patient today    Identification of Risk Factors:  Risk factors include: increased fall risk and depression.    Review of Systems    Compared to one year ago, the patient feels her physical health is worse.  Compared to one year ago, the patient feels her mental health is worse.    Objective     Physical Exam    Vitals:    07/27/18 1503   BP: 134/76   Pulse: 92   Temp: 98.3 °F (36.8 °C)   TempSrc: Oral   SpO2: 99%   Weight: 49.4 kg (109 lb)   PainSc: 0-No pain       Patient's Body mass index is 19.31 kg/m². BMI is within normal parameters. No follow-up required.      Assessment/Plan   Patient Self-Management and Personalized Health Advice  The patient has been provided with information about: fall prevention and mental health concerns and preventive services including:   · declines pneumovax.    Visit Diagnoses:    ICD-10-CM ICD-9-CM   1. Complicated grief F43.29 309.0    Z63.4    2. B12 deficiency E53.8 266.2       No orders of the  defined types were placed in this encounter.      Outpatient Encounter Prescriptions as of 7/27/2018   Medication Sig Dispense Refill   • Cholecalciferol (VITAMIN D) 1000 UNITS capsule Take 1 capsule by mouth daily.     • clotrimazole (LOTRIMIN) 1 % cream Apply  topically Every 12 (Twelve) Hours. 14 g 0   • ferrous sulfate 325 (65 FE) MG tablet Take 1 tablet by mouth Daily With Breakfast. 30 tablet 3   • fluticasone (FLONASE) 50 MCG/ACT nasal spray 2 sprays into each nostril Daily. 15.8 mL 0   • furosemide (LASIX) 40 MG tablet Take 40 mg by mouth.     • metoprolol succinate XL (TOPROL-XL) 25 MG 24 hr tablet      • Multiple Vitamins-Minerals (MULTIVITAL PO) Take  by mouth.     • pantoprazole (PROTONIX) 40 MG EC tablet Take 1 tablet by mouth 2 (two) times a day.     • potassium chloride (K-DUR) 10 MEQ CR tablet TAKE TWO TABLETS BY MOUTH DAILY 60 tablet 2   • warfarin (COUMADIN) 5 MG tablet Take by mouth. Take as directed       Facility-Administered Encounter Medications as of 7/27/2018   Medication Dose Route Frequency Provider Last Rate Last Dose   • cyanocobalamin injection 1,000 mcg  1,000 mcg Intramuscular Q30 Days KIRSTEN Martinez   1,000 mcg at 06/29/18 1503       Reviewed use of high risk medication in the elderly: yes  Reviewed for potential of harmful drug interactions in the elderly: yes    Follow Up:  No Follow-up on file.     An After Visit Summary and PPPS with all of these plans were given to the patient.

## 2018-07-27 NOTE — PROGRESS NOTES
Subjective   Chiquita Anderson is a 83 y.o. female who presents for a follow up after a fall that occurred 2 days ago at home. Now with hematoma to back of left leg.     History of Present Illness   Fall occurred when slipping climbing into the tub. Not sleeping well. Missing her spouse more and more according to daughter. Worried she is having some confusion, worried about dementia.  The following portions of the patient's history were reviewed and updated as appropriate: allergies, current medications, past family history, past medical history, past social history, past surgical history and problem list.    Review of Systems   Constitutional: Positive for fatigue. Negative for fever, unexpected weight gain and unexpected weight loss.   HENT: Negative.    Eyes: Negative.    Respiratory: Negative.    Cardiovascular: Positive for leg swelling (controlled with compression).   Gastrointestinal: Negative for abdominal pain.   Endocrine: Negative.    Musculoskeletal: Positive for gait problem (uses walker).   Skin: Positive for bruise.   Neurological: Positive for weakness.   Psychiatric/Behavioral: Positive for sleep disturbance, depressed mood and stress. Negative for self-injury and suicidal ideas.     /76   Pulse 92   Temp 98.3 °F (36.8 °C) (Oral)   Wt 49.4 kg (109 lb)   SpO2 99%   BMI 19.31 kg/m²     Objective   Physical Exam   Constitutional: She appears well-developed and well-nourished.   Cardiovascular: Normal heart sounds.  An irregularly irregular rhythm present.   Pulses:       Dorsalis pedis pulses are 1+ on the right side, and 1+ on the left side.   Pulmonary/Chest: Effort normal and breath sounds normal.   Musculoskeletal: She exhibits no edema.   Skin: Skin is warm and dry. Capillary refill takes less than 2 seconds.   Subtle bruising, posterior calves, non tender no palpable borders. L anterior shin with subtle irregular borders, fading.    Psychiatric: Her speech is normal and behavior is normal.  Thought content normal. Her mood appears anxious. She exhibits a depressed mood.   MMSE 12/30   Nursing note and vitals reviewed.    Assessment/Plan   Problems Addressed this Visit        Digestive    B12 deficiency       Other    Complicated grief - Primary      Other Visit Diagnoses     Ingrown toenail without infection        Cognitive changes            Ingrown toenail. L, soak roll lateral fold back. Follow up with podiatry if not settling.   Bruises--should settle, no sign of DVT today.   Grief and memory loss--recommend aricept, declines today. Declines counseling. 45 min spent face to face with pt>50% counseling regarding complicated grief and cognitive changes. Strongly declines any interventions today. Attempted to educate pt and daughter about diagnosis and prognosis for dementia, complicated by her untreated depression.

## 2018-08-10 ENCOUNTER — OFFICE VISIT (OUTPATIENT)
Dept: FAMILY MEDICINE CLINIC | Facility: CLINIC | Age: 83
End: 2018-08-10

## 2018-08-10 VITALS
DIASTOLIC BLOOD PRESSURE: 74 MMHG | WEIGHT: 111 LBS | HEART RATE: 81 BPM | OXYGEN SATURATION: 98 % | TEMPERATURE: 98.1 F | SYSTOLIC BLOOD PRESSURE: 128 MMHG | BODY MASS INDEX: 19.66 KG/M2

## 2018-08-10 DIAGNOSIS — L72.3 SEBACEOUS CYST OF FINGER OF LEFT HAND: ICD-10-CM

## 2018-08-10 DIAGNOSIS — S51.802A AVULSION OF SKIN OF FOREARM, LEFT, INITIAL ENCOUNTER: Primary | ICD-10-CM

## 2018-08-10 DIAGNOSIS — M51.36 LUMBAR DEGENERATIVE DISC DISEASE: ICD-10-CM

## 2018-08-10 PROCEDURE — 99214 OFFICE O/P EST MOD 30 MIN: CPT | Performed by: NURSE PRACTITIONER

## 2018-08-10 NOTE — PROGRESS NOTES
"Bita Anderson is a 83 y.o. female who presents with laceration to left forearm after a fall that occurred at home last night.     History of Present Illness   Fall occurred secondary to lost balance against walker. Having more issues with low back pain. With walk increase in RLE pain. No leg cramping. When starts to walk, experiences pain in R hip area, none to knee or ankle area. Feels generally \"weak in the feet.\" Has sit down HEP, does only 1 x weekly. Has had several falls in the last year. Not using walker in the house, except at night.    The following portions of the patient's history were reviewed and updated as appropriate: allergies, current medications, past family history, past medical history, past social history, past surgical history and problem list.    Review of Systems   Constitutional: Positive for activity change. Negative for chills and fever.   Gastrointestinal: Negative for constipation (no bowel or bladder changes) and diarrhea.   Genitourinary: Negative for urinary incontinence.   Musculoskeletal: Positive for arthralgias and back pain. Negative for gait problem.   Neurological: Negative for weakness and numbness.   Psychiatric/Behavioral: Negative.      /74   Pulse 81   Temp 98.1 °F (36.7 °C) (Oral)   Wt 50.3 kg (111 lb)   SpO2 98%   BMI 19.66 kg/m²     Objective   Physical Exam   Constitutional: She appears well-developed and well-nourished. No distress.   Cardiovascular: Normal rate.    Pulmonary/Chest: Effort normal.   Musculoskeletal:        Lumbar back: She exhibits decreased range of motion, tenderness and pain.   SLR negative bilateral. No SI tenderness.    Skin: Skin is warm and dry.   L forearm with partial thickness skin tear, 4 inches x 1/2 inch, skin not retractable.     Also L finger cyst to index.    Psychiatric: She has a normal mood and affect. Her behavior is normal. Judgment and thought content normal.   Nursing note and vitals " reviewed.      Assessment/Plan   Problems Addressed this Visit        Musculoskeletal and Integument    Avulsion of skin of forearm, left, initial encounter - Primary    Lumbar degenerative disc disease      Other Visit Diagnoses     Sebaceous cyst of finger of left hand        Relevant Orders    Ambulatory Referral to Dermatology        LDD-needs HEP daily. Not much radicular symptoms. Doubt role for FRANDY at this point.   Avulsion--wash with soap and water daily. Apply neosporin, nonstick bandage, keep covered. Change bandage daily. FU 3 weeks.   Cyst--leave alone, FU if gets irritated. Prefers refer to derm for excision. Prefers at [facility]  Continue to push diet to gain weight.   30 min face to face>50% spent on counseling and coordination of care.

## 2018-08-15 ENCOUNTER — OFFICE VISIT (OUTPATIENT)
Dept: FAMILY MEDICINE CLINIC | Facility: CLINIC | Age: 83
End: 2018-08-15

## 2018-08-15 VITALS
HEIGHT: 59 IN | BODY MASS INDEX: 22.58 KG/M2 | WEIGHT: 112 LBS | OXYGEN SATURATION: 98 % | SYSTOLIC BLOOD PRESSURE: 126 MMHG | HEART RATE: 80 BPM | TEMPERATURE: 97.9 F | DIASTOLIC BLOOD PRESSURE: 62 MMHG

## 2018-08-15 DIAGNOSIS — Z87.81 HISTORY OF FRACTURE OF RIGHT HIP: ICD-10-CM

## 2018-08-15 DIAGNOSIS — M25.552 LEFT HIP PAIN: Primary | ICD-10-CM

## 2018-08-15 DIAGNOSIS — R93.6 ABNORMAL FINDINGS ON DIAGNOSTIC IMAGING OF LIMBS: ICD-10-CM

## 2018-08-15 DIAGNOSIS — M25.562 ACUTE PAIN OF LEFT KNEE: ICD-10-CM

## 2018-08-15 PROCEDURE — 99213 OFFICE O/P EST LOW 20 MIN: CPT | Performed by: NURSE PRACTITIONER

## 2018-08-15 PROCEDURE — 73502 X-RAY EXAM HIP UNI 2-3 VIEWS: CPT | Performed by: NURSE PRACTITIONER

## 2018-08-15 PROCEDURE — 73560 X-RAY EXAM OF KNEE 1 OR 2: CPT | Performed by: NURSE PRACTITIONER

## 2018-08-15 NOTE — PROGRESS NOTES
"Subjective   Chiquita Anderson is a 83 y.o. female who presents to us today for left knee and left hip pain.     History of Present Illness   L hip and knee pain present since fall 8/9/18, plans to get appt with ortho. Reports did not actually fall. Just hit side of sofa. Now with pain severe enough can hardly walk. Does walk with assistance of walker, but doesn't use much in the house.   The following portions of the patient's history were reviewed and updated as appropriate: allergies, current medications, past family history, past medical history, past social history, past surgical history and problem list.    Review of Systems   Constitutional: Positive for activity change. Negative for chills and fever.   Gastrointestinal: Negative for constipation (no bowel or bladder changes) and diarrhea.   Genitourinary: Negative for urinary incontinence.   Musculoskeletal: Positive for arthralgias, back pain (worse with bending over) and bursitis (L lateral hip pain). Negative for gait problem.   Neurological: Negative for weakness and numbness.   Psychiatric/Behavioral: Negative.      /62 (BP Location: Right arm, Patient Position: Sitting, Cuff Size: Adult)   Pulse 80   Temp 97.9 °F (36.6 °C) (Oral)   Ht 149.9 cm (59\")   Wt 50.8 kg (112 lb)   SpO2 98%   BMI 22.62 kg/m²     Objective   Physical Exam   Musculoskeletal:        Left hip: She exhibits decreased strength and tenderness (greater trochanter bursal ). She exhibits no swelling and no deformity.        Left knee: She exhibits normal range of motion, no swelling, no effusion, no LCL laxity, normal patellar mobility, normal meniscus and no MCL laxity. Tenderness found. Medial joint line and lateral joint line tenderness noted. No MCL, no LCL and no patellar tendon tenderness noted.        Lumbar back: She exhibits decreased range of motion. She exhibits no tenderness, no bony tenderness, no pain and no spasm.   Nursing note and vitals " reviewed.    Assessment/Plan   Problems Addressed this Visit     None      Visit Diagnoses     Left hip pain    -  Primary    Relevant Orders    XR Hip With or Without Pelvis 2 - 3 View Left (Completed)    Acute pain of left knee        Relevant Orders    XR Knee 1 or 2 View Left (Completed)    History of fracture of right hip        Relevant Orders    DEXA Bone Density Axial    Abnormal findings on diagnostic imaging of limbs         Relevant Orders    DEXA Bone Density Axial        Xray L hip--DJD, no comparison film, will send to radiology for opinion  Xray L knee--DJD, no comparison film, will send to radiology for opinion.     Recommend increased walker use. Prn follow up with ortho. Tylenol for pain.

## 2018-08-27 ENCOUNTER — CLINICAL SUPPORT (OUTPATIENT)
Dept: FAMILY MEDICINE CLINIC | Facility: CLINIC | Age: 83
End: 2018-08-27

## 2018-08-27 DIAGNOSIS — E53.8 B12 DEFICIENCY: ICD-10-CM

## 2018-08-27 PROCEDURE — 96372 THER/PROPH/DIAG INJ SC/IM: CPT | Performed by: FAMILY MEDICINE

## 2018-08-27 RX ADMIN — CYANOCOBALAMIN 1000 MCG: 1000 INJECTION, SOLUTION INTRAMUSCULAR; SUBCUTANEOUS at 14:31

## 2018-09-27 ENCOUNTER — TELEPHONE (OUTPATIENT)
Dept: FAMILY MEDICINE CLINIC | Facility: CLINIC | Age: 83
End: 2018-09-27

## 2018-10-05 ENCOUNTER — TELEPHONE (OUTPATIENT)
Dept: FAMILY MEDICINE CLINIC | Facility: CLINIC | Age: 83
End: 2018-10-05

## 2018-10-05 NOTE — TELEPHONE ENCOUNTER
Ok for letter to state evidence of cognitive impairment and dementia at last AWV. Not competent to make own decisions.

## 2018-10-05 NOTE — TELEPHONE ENCOUNTER
Ok for letter stating pt with some signs of cognitive impairment, but competent to sign name and understand turning POA over to daughter, Maite.

## 2018-10-05 NOTE — TELEPHONE ENCOUNTER
Daughter went back to  office today and has things turned out. He needs a letter stating patient would be competent enough to turn POA over to her daughter and sign her name.

## 2018-10-10 ENCOUNTER — TELEPHONE (OUTPATIENT)
Dept: FAMILY MEDICINE CLINIC | Facility: CLINIC | Age: 83
End: 2018-10-10

## 2018-10-11 ENCOUNTER — TELEPHONE (OUTPATIENT)
Dept: FAMILY MEDICINE CLINIC | Facility: CLINIC | Age: 83
End: 2018-10-11

## 2018-10-11 NOTE — TELEPHONE ENCOUNTER
Daughter informed. She plans to ask MD who is seeing her in rehab currently to write letter. If this does not work, she will ask  for further advice.

## 2018-10-11 NOTE — TELEPHONE ENCOUNTER
Dr Wilson cannot write letter as has not evaluated the patient in many years. If could obtain chart notes from the psychiatrist, could attest to the veracity of psychiatrist's assessment. Maite would have to get notes from the psychiatrist's assessment.

## 2018-10-12 ENCOUNTER — TELEPHONE (OUTPATIENT)
Dept: FAMILY MEDICINE CLINIC | Facility: CLINIC | Age: 83
End: 2018-10-12

## 2018-10-15 ENCOUNTER — TELEPHONE (OUTPATIENT)
Dept: FAMILY MEDICINE CLINIC | Facility: CLINIC | Age: 83
End: 2018-10-15

## 2018-10-15 NOTE — TELEPHONE ENCOUNTER
Patient has appt in office tomorrow. Daughter is explaining the process of POA to patient tonight. Daughter wants to know if you will be the witness to her signing this document in the office tomorrow.

## 2018-10-16 ENCOUNTER — OFFICE VISIT (OUTPATIENT)
Dept: FAMILY MEDICINE CLINIC | Facility: CLINIC | Age: 83
End: 2018-10-16

## 2018-10-16 VITALS
OXYGEN SATURATION: 96 % | HEART RATE: 79 BPM | DIASTOLIC BLOOD PRESSURE: 78 MMHG | HEIGHT: 59 IN | SYSTOLIC BLOOD PRESSURE: 122 MMHG | BODY MASS INDEX: 24.19 KG/M2 | TEMPERATURE: 97.6 F | WEIGHT: 120 LBS

## 2018-10-16 DIAGNOSIS — R26.9 GAIT ABNORMALITY: ICD-10-CM

## 2018-10-16 DIAGNOSIS — E53.8 B12 DEFICIENCY: ICD-10-CM

## 2018-10-16 DIAGNOSIS — I50.9 CONGESTIVE HEART FAILURE, UNSPECIFIED HF CHRONICITY, UNSPECIFIED HEART FAILURE TYPE (HCC): ICD-10-CM

## 2018-10-16 DIAGNOSIS — R63.4 WEIGHT LOSS, ABNORMAL: ICD-10-CM

## 2018-10-16 DIAGNOSIS — F43.21 COMPLICATED GRIEF: Primary | ICD-10-CM

## 2018-10-16 PROCEDURE — 96372 THER/PROPH/DIAG INJ SC/IM: CPT | Performed by: NURSE PRACTITIONER

## 2018-10-16 PROCEDURE — 99214 OFFICE O/P EST MOD 30 MIN: CPT | Performed by: NURSE PRACTITIONER

## 2018-10-16 RX ORDER — POTASSIUM CHLORIDE 750 MG/1
20 TABLET, FILM COATED, EXTENDED RELEASE ORAL DAILY
Qty: 60 TABLET | Refills: 2 | Status: SHIPPED | OUTPATIENT
Start: 2018-10-16 | End: 2019-03-11 | Stop reason: SDUPTHER

## 2018-10-16 RX ORDER — BUSPIRONE HYDROCHLORIDE 5 MG/1
5 TABLET ORAL 3 TIMES DAILY
COMMUNITY
Start: 2018-10-15 | End: 2018-11-16 | Stop reason: SDUPTHER

## 2018-10-16 RX ORDER — HYDROCODONE BITARTRATE AND ACETAMINOPHEN 5; 325 MG/1; MG/1
1 TABLET ORAL
COMMUNITY
Start: 2018-09-06 | End: 2018-11-30

## 2018-10-16 RX ORDER — WARFARIN SODIUM 3 MG/1
TABLET ORAL
COMMUNITY
Start: 2018-10-15

## 2018-10-16 RX ORDER — MIRTAZAPINE 15 MG/1
15 TABLET, FILM COATED ORAL NIGHTLY
COMMUNITY
Start: 2018-10-15 | End: 2018-11-08 | Stop reason: SDUPTHER

## 2018-10-16 RX ORDER — FUROSEMIDE 40 MG/1
40 TABLET ORAL
COMMUNITY
Start: 2018-09-06 | End: 2018-10-16 | Stop reason: SDUPTHER

## 2018-10-16 RX ORDER — METOPROLOL SUCCINATE 25 MG/1
TABLET, EXTENDED RELEASE ORAL
COMMUNITY
Start: 2018-08-21 | End: 2018-10-16 | Stop reason: SDUPTHER

## 2018-10-16 RX ADMIN — CYANOCOBALAMIN 1000 MCG: 1000 INJECTION, SOLUTION INTRAMUSCULAR; SUBCUTANEOUS at 15:18

## 2018-10-16 NOTE — PROGRESS NOTES
"Subjective   Chiquita Anderson is a 83 y.o. female who presents for a follow up on left femur fracture that occurred 9/3/18. Was admitted to [facility] and then transferred to [facility].     History of Present Illness   Just home yesterday from rehab. Slept well last night, tiring day yesterday. Plans to restart PT, follow up with orthopedics tomorrow.    Now on buspirone and mirtazapine for depression and anxiety. Pt does not want to take, but depression very severe since her spouse passed 2 1/2 yrs ago.      Was not drinking enough water in [facility] and blood pressure was dropping in [facility], at times nurses were holding her metoprolol. Maite is not sure what she should do.   The following portions of the patient's history were reviewed and updated as appropriate: allergies, current medications, past family history, past medical history, past social history, past surgical history and problem list.    Review of Systems   Constitutional: Positive for fatigue. Negative for activity change, appetite change, unexpected weight gain and unexpected weight loss.   Respiratory: Negative.  Negative for shortness of breath.    Cardiovascular: Negative.  Negative for chest pain, palpitations and leg swelling.   Gastrointestinal: Negative for abdominal distention.   Genitourinary: Positive for urinary incontinence. Negative for dysuria.   Musculoskeletal: Positive for arthralgias and gait problem (using walker).   Neurological: Positive for memory problem (knows family members, knows myself and Caroline, oriented to situation and history). Negative for confusion.   Hematological: Bruises/bleeds easily.   Psychiatric/Behavioral: Positive for sleep disturbance and depressed mood. Negative for hallucinations, self-injury and suicidal ideas. The patient is nervous/anxious.      /78   Pulse 79   Temp 97.6 °F (36.4 °C) (Oral)   Ht 149.9 cm (59\")   Wt 54.4 kg (120 lb)   SpO2 96%   BMI 24.24 kg/m²     Objective "   Physical Exam   Constitutional: She appears well-developed and well-nourished.   Cardiovascular: Normal rate.  An irregularly irregular rhythm present.   Pulmonary/Chest: Effort normal and breath sounds normal.   Musculoskeletal: She exhibits edema (trace edema to unique LE).   Skin: Skin is warm and dry.   Psychiatric: Her speech is normal and behavior is normal. Thought content normal. Her mood appears not anxious. She exhibits a depressed mood.   Clock drawing not abnormal   Nursing note and vitals reviewed.    Assessment/Plan   Problems Addressed this Visit        Cardiovascular and Mediastinum    CHF (congestive heart failure) (CMS/HCC)       Digestive    B12 deficiency       Other    Weight loss, abnormal    Gait abnormality    Complicated grief - Primary        Check blood pressure before metoprolol dosing, as was low, consider holding if SBP<100.   Complicated grief and cachexia--Has gained weight and near back to baseline with mirtazapine, would continue indefinitely. Strongly recommend counseling to proceed through grief.  Gait abnormality--recommend full time walker use, even in the home.   CHF--euvolemic today, continue current diuretics. Continue to follow with CHF clinic.

## 2018-11-08 ENCOUNTER — TELEPHONE (OUTPATIENT)
Dept: FAMILY MEDICINE CLINIC | Facility: CLINIC | Age: 83
End: 2018-11-08

## 2018-11-08 RX ORDER — MIRTAZAPINE 15 MG/1
15 TABLET, FILM COATED ORAL NIGHTLY
Qty: 30 TABLET | Refills: 3 | Status: SHIPPED | OUTPATIENT
Start: 2018-11-08 | End: 2018-12-26 | Stop reason: SDUPTHER

## 2018-11-16 ENCOUNTER — TELEPHONE (OUTPATIENT)
Dept: FAMILY MEDICINE CLINIC | Facility: CLINIC | Age: 83
End: 2018-11-16

## 2018-11-16 RX ORDER — BUSPIRONE HYDROCHLORIDE 5 MG/1
5 TABLET ORAL 3 TIMES DAILY
Qty: 90 TABLET | Refills: 0 | Status: SHIPPED | OUTPATIENT
Start: 2018-11-16 | End: 2019-01-02 | Stop reason: SDUPTHER

## 2018-11-19 ENCOUNTER — OFFICE (OUTPATIENT)
Dept: URBAN - METROPOLITAN AREA CLINIC 42 | Facility: CLINIC | Age: 83
End: 2018-11-19

## 2018-11-19 VITALS
WEIGHT: 121 LBS | HEART RATE: 83 BPM | DIASTOLIC BLOOD PRESSURE: 72 MMHG | HEIGHT: 59 IN | SYSTOLIC BLOOD PRESSURE: 109 MMHG

## 2018-11-19 DIAGNOSIS — Z79.01 LONG TERM (CURRENT) USE OF ANTICOAGULANTS: ICD-10-CM

## 2018-11-19 DIAGNOSIS — R13.10 DYSPHAGIA, UNSPECIFIED: ICD-10-CM

## 2018-11-19 DIAGNOSIS — K30 FUNCTIONAL DYSPEPSIA: ICD-10-CM

## 2018-11-19 DIAGNOSIS — K21.9 GASTRO-ESOPHAGEAL REFLUX DISEASE WITHOUT ESOPHAGITIS: ICD-10-CM

## 2018-11-19 PROCEDURE — 99214 OFFICE O/P EST MOD 30 MIN: CPT

## 2018-11-26 ENCOUNTER — CLINICAL SUPPORT (OUTPATIENT)
Dept: FAMILY MEDICINE CLINIC | Facility: CLINIC | Age: 83
End: 2018-11-26

## 2018-11-26 DIAGNOSIS — E53.8 B12 DEFICIENCY: ICD-10-CM

## 2018-11-26 PROCEDURE — 96372 THER/PROPH/DIAG INJ SC/IM: CPT | Performed by: FAMILY MEDICINE

## 2018-11-26 RX ADMIN — CYANOCOBALAMIN 1000 MCG: 1000 INJECTION, SOLUTION INTRAMUSCULAR; SUBCUTANEOUS at 14:29

## 2018-11-30 ENCOUNTER — OFFICE VISIT (OUTPATIENT)
Dept: FAMILY MEDICINE CLINIC | Facility: CLINIC | Age: 83
End: 2018-11-30

## 2018-11-30 VITALS
OXYGEN SATURATION: 96 % | TEMPERATURE: 98.4 F | DIASTOLIC BLOOD PRESSURE: 74 MMHG | SYSTOLIC BLOOD PRESSURE: 118 MMHG | WEIGHT: 118 LBS | HEART RATE: 90 BPM | BODY MASS INDEX: 23.83 KG/M2

## 2018-11-30 DIAGNOSIS — R41.89 COGNITIVE CHANGES: ICD-10-CM

## 2018-11-30 DIAGNOSIS — F43.21 COMPLICATED GRIEF: ICD-10-CM

## 2018-11-30 DIAGNOSIS — I50.9 CONGESTIVE HEART FAILURE, UNSPECIFIED HF CHRONICITY, UNSPECIFIED HEART FAILURE TYPE (HCC): ICD-10-CM

## 2018-11-30 DIAGNOSIS — R63.4 WEIGHT LOSS, ABNORMAL: ICD-10-CM

## 2018-11-30 DIAGNOSIS — M17.12 PRIMARY OSTEOARTHRITIS OF LEFT KNEE: Primary | ICD-10-CM

## 2018-11-30 PROCEDURE — 20610 DRAIN/INJ JOINT/BURSA W/O US: CPT | Performed by: NURSE PRACTITIONER

## 2018-11-30 PROCEDURE — 99214 OFFICE O/P EST MOD 30 MIN: CPT | Performed by: NURSE PRACTITIONER

## 2018-12-02 RX ORDER — TRIAMCINOLONE ACETONIDE 40 MG/ML
40 INJECTION, SUSPENSION INTRA-ARTICULAR; INTRAMUSCULAR ONCE
Status: DISCONTINUED | OUTPATIENT
Start: 2018-12-02 | End: 2019-01-01

## 2018-12-02 RX ORDER — LIDOCAINE HYDROCHLORIDE 20 MG/ML
2 INJECTION, SOLUTION INFILTRATION; PERINEURAL ONCE
Status: DISCONTINUED | OUTPATIENT
Start: 2018-12-02 | End: 2019-01-01

## 2018-12-26 ENCOUNTER — OFFICE VISIT (OUTPATIENT)
Dept: FAMILY MEDICINE CLINIC | Facility: CLINIC | Age: 83
End: 2018-12-26

## 2018-12-26 VITALS
WEIGHT: 115 LBS | BODY MASS INDEX: 23.18 KG/M2 | SYSTOLIC BLOOD PRESSURE: 124 MMHG | DIASTOLIC BLOOD PRESSURE: 62 MMHG | TEMPERATURE: 97.8 F | HEIGHT: 59 IN | HEART RATE: 85 BPM | OXYGEN SATURATION: 97 %

## 2018-12-26 DIAGNOSIS — F43.21 COMPLICATED GRIEF: ICD-10-CM

## 2018-12-26 DIAGNOSIS — E44.0 PROTEIN-CALORIE MALNUTRITION, MODERATE (HCC): Primary | ICD-10-CM

## 2018-12-26 PROCEDURE — 99213 OFFICE O/P EST LOW 20 MIN: CPT | Performed by: NURSE PRACTITIONER

## 2018-12-26 PROCEDURE — 96372 THER/PROPH/DIAG INJ SC/IM: CPT | Performed by: NURSE PRACTITIONER

## 2018-12-26 RX ORDER — MIRTAZAPINE 30 MG/1
30 TABLET, FILM COATED ORAL NIGHTLY
Qty: 30 TABLET | Refills: 3 | Status: SHIPPED | OUTPATIENT
Start: 2018-12-26 | End: 2019-02-26 | Stop reason: SDUPTHER

## 2018-12-26 RX ADMIN — CYANOCOBALAMIN 1000 MCG: 1000 INJECTION, SOLUTION INTRAMUSCULAR; SUBCUTANEOUS at 15:16

## 2018-12-26 NOTE — PROGRESS NOTES
"Bita Anderson is a 83 y.o. female who presents for a one month follow up on complicated grief, cognitive changes.     History of Present Illness   Had a difficult day yesterday, missing spouse. Though over the last month, has not slept well at all. Worried about furnace, though has checked on a regular basis. Daughter reports she is not focusing well.   The following portions of the patient's history were reviewed and updated as appropriate: allergies, current medications, past family history, past medical history, past social history, past surgical history and problem list.    Review of Systems   Constitutional: Positive for unexpected weight loss. Negative for chills.   Respiratory: Negative.    Cardiovascular: Positive for leg swelling (trace, wears compression). Negative for chest pain and palpitations.   Musculoskeletal: Positive for arthralgias.   Neurological: Positive for memory problem and confusion. Negative for syncope, speech difficulty and weakness.   Psychiatric/Behavioral: Positive for decreased concentration, sleep disturbance, depressed mood and stress. Negative for self-injury and suicidal ideas. The patient is nervous/anxious.      /62   Pulse 85   Temp 97.8 °F (36.6 °C) (Oral)   Ht 149.9 cm (59\")   Wt 52.2 kg (115 lb)   SpO2 97%   BMI 23.23 kg/m²     Objective   Physical Exam   Constitutional: She appears cachectic. No distress.   Cardiovascular: Normal rate.   Pulmonary/Chest: Effort normal.   Skin: Skin is warm and dry.   Psychiatric: Judgment and thought content normal. She is agitated. She is not actively hallucinating. She exhibits a depressed mood. She exhibits abnormal recent memory.   Nursing note and vitals reviewed.    Assessment/Plan   Problems Addressed this Visit        Digestive    Protein-calorie malnutrition, moderate (CMS/HCC) - Primary       Other    Complicated grief      PCM--continue to offer a varied diet, increase mirtazapine. Consider psychiatry " referral.   Complicated grief and cognitive changes--Increase mirtazapine to 30mg nightly to smooth sleep. Strongly declines any additions to medications. Declines any additions for memory issues.   FU 1 month

## 2018-12-27 ENCOUNTER — TELEPHONE (OUTPATIENT)
Dept: FAMILY MEDICINE CLINIC | Facility: CLINIC | Age: 83
End: 2018-12-27

## 2019-01-01 ENCOUNTER — OFFICE VISIT (OUTPATIENT)
Dept: FAMILY MEDICINE CLINIC | Facility: CLINIC | Age: 84
End: 2019-01-01

## 2019-01-01 ENCOUNTER — TELEPHONE (OUTPATIENT)
Dept: FAMILY MEDICINE CLINIC | Facility: CLINIC | Age: 84
End: 2019-01-01

## 2019-01-01 ENCOUNTER — CLINICAL SUPPORT (OUTPATIENT)
Dept: FAMILY MEDICINE CLINIC | Facility: CLINIC | Age: 84
End: 2019-01-01

## 2019-01-01 VITALS
WEIGHT: 118 LBS | SYSTOLIC BLOOD PRESSURE: 118 MMHG | OXYGEN SATURATION: 97 % | HEIGHT: 59 IN | DIASTOLIC BLOOD PRESSURE: 74 MMHG | HEART RATE: 76 BPM | TEMPERATURE: 97.8 F | BODY MASS INDEX: 23.79 KG/M2

## 2019-01-01 VITALS
WEIGHT: 113 LBS | DIASTOLIC BLOOD PRESSURE: 62 MMHG | TEMPERATURE: 98.3 F | HEIGHT: 59 IN | BODY MASS INDEX: 22.78 KG/M2 | OXYGEN SATURATION: 96 % | HEART RATE: 91 BPM | SYSTOLIC BLOOD PRESSURE: 112 MMHG

## 2019-01-01 VITALS
BODY MASS INDEX: 24.8 KG/M2 | HEART RATE: 90 BPM | HEIGHT: 59 IN | DIASTOLIC BLOOD PRESSURE: 78 MMHG | WEIGHT: 123 LBS | OXYGEN SATURATION: 96 % | SYSTOLIC BLOOD PRESSURE: 124 MMHG | TEMPERATURE: 98.1 F

## 2019-01-01 VITALS
HEIGHT: 59 IN | TEMPERATURE: 98 F | DIASTOLIC BLOOD PRESSURE: 74 MMHG | HEART RATE: 96 BPM | OXYGEN SATURATION: 97 % | WEIGHT: 123 LBS | BODY MASS INDEX: 24.8 KG/M2 | SYSTOLIC BLOOD PRESSURE: 122 MMHG

## 2019-01-01 VITALS
WEIGHT: 125 LBS | HEIGHT: 59 IN | HEART RATE: 84 BPM | TEMPERATURE: 98.4 F | OXYGEN SATURATION: 98 % | BODY MASS INDEX: 25.2 KG/M2 | DIASTOLIC BLOOD PRESSURE: 76 MMHG | SYSTOLIC BLOOD PRESSURE: 118 MMHG

## 2019-01-01 VITALS
TEMPERATURE: 98.3 F | HEART RATE: 73 BPM | SYSTOLIC BLOOD PRESSURE: 120 MMHG | BODY MASS INDEX: 24.6 KG/M2 | WEIGHT: 122 LBS | OXYGEN SATURATION: 99 % | DIASTOLIC BLOOD PRESSURE: 74 MMHG | HEIGHT: 59 IN

## 2019-01-01 DIAGNOSIS — R26.9 GAIT ABNORMALITY: Primary | ICD-10-CM

## 2019-01-01 DIAGNOSIS — R46.89 COGNITIVE AND BEHAVIORAL CHANGES: ICD-10-CM

## 2019-01-01 DIAGNOSIS — I48.21 PERMANENT ATRIAL FIBRILLATION (HCC): ICD-10-CM

## 2019-01-01 DIAGNOSIS — E53.8 B12 DEFICIENCY: ICD-10-CM

## 2019-01-01 DIAGNOSIS — R41.89 COGNITIVE AND BEHAVIORAL CHANGES: ICD-10-CM

## 2019-01-01 DIAGNOSIS — M81.0 POST-MENOPAUSAL OSTEOPOROSIS: Primary | ICD-10-CM

## 2019-01-01 DIAGNOSIS — E53.8 B12 DEFICIENCY: Primary | ICD-10-CM

## 2019-01-01 DIAGNOSIS — F43.21 COMPLICATED GRIEF: ICD-10-CM

## 2019-01-01 DIAGNOSIS — L03.119 CELLULITIS OF LOWER EXTREMITY, UNSPECIFIED LATERALITY: ICD-10-CM

## 2019-01-01 DIAGNOSIS — M18.11 DEGENERATIVE ARTHRITIS OF THUMB, RIGHT: ICD-10-CM

## 2019-01-01 DIAGNOSIS — L60.0 INGROWN LEFT BIG TOENAIL: ICD-10-CM

## 2019-01-01 DIAGNOSIS — Z23 IMMUNIZATION DUE: ICD-10-CM

## 2019-01-01 DIAGNOSIS — B37.2 CANDIDAL DIAPER DERMATITIS: ICD-10-CM

## 2019-01-01 DIAGNOSIS — I50.32 CHRONIC DIASTOLIC CONGESTIVE HEART FAILURE (HCC): ICD-10-CM

## 2019-01-01 DIAGNOSIS — R26.9 GAIT ABNORMALITY: ICD-10-CM

## 2019-01-01 DIAGNOSIS — B37.2 YEAST DERMATITIS: ICD-10-CM

## 2019-01-01 DIAGNOSIS — S81.811D LACERATION OF SKIN OF RIGHT LOWER LEG, SUBSEQUENT ENCOUNTER: ICD-10-CM

## 2019-01-01 DIAGNOSIS — R29.6 FREQUENT FALLS: ICD-10-CM

## 2019-01-01 DIAGNOSIS — S81.809S: ICD-10-CM

## 2019-01-01 DIAGNOSIS — L22 CANDIDAL DIAPER DERMATITIS: ICD-10-CM

## 2019-01-01 DIAGNOSIS — S80.812A ABRASION OF LEFT LOWER LEG, INITIAL ENCOUNTER: ICD-10-CM

## 2019-01-01 DIAGNOSIS — B37.2 YEAST DERMATITIS: Primary | ICD-10-CM

## 2019-01-01 DIAGNOSIS — D51.9 ANEMIA DUE TO VITAMIN B12 DEFICIENCY, UNSPECIFIED B12 DEFICIENCY TYPE: Primary | ICD-10-CM

## 2019-01-01 DIAGNOSIS — R46.89 COGNITIVE AND BEHAVIORAL CHANGES: Primary | ICD-10-CM

## 2019-01-01 DIAGNOSIS — E87.5 HYPERKALEMIA: Primary | ICD-10-CM

## 2019-01-01 DIAGNOSIS — K21.9 GASTROESOPHAGEAL REFLUX DISEASE WITHOUT ESOPHAGITIS: ICD-10-CM

## 2019-01-01 DIAGNOSIS — R41.89 COGNITIVE AND BEHAVIORAL CHANGES: Primary | ICD-10-CM

## 2019-01-01 LAB
ALBUMIN SERPL-MCNC: 4.2 G/DL (ref 3.5–4.7)
ALBUMIN/GLOB SERPL: 1.9 {RATIO} (ref 1.2–2.2)
ALP SERPL-CCNC: 146 IU/L (ref 39–117)
ALT SERPL-CCNC: 19 IU/L (ref 0–32)
AST SERPL-CCNC: 38 IU/L (ref 0–40)
BACTERIA SPEC AEROBE CULT: ABNORMAL
BACTERIA SPEC ANAEROBE CULT: ABNORMAL
BACTERIA SPEC CULT: ABNORMAL
BASOPHILS # BLD AUTO: 0 X10E3/UL (ref 0–0.2)
BASOPHILS # BLD AUTO: 0 X10E3/UL (ref 0–0.2)
BASOPHILS NFR BLD AUTO: 0 %
BASOPHILS NFR BLD AUTO: 0 %
BILIRUB SERPL-MCNC: 0.6 MG/DL (ref 0–1.2)
BUN SERPL-MCNC: 20 MG/DL (ref 8–27)
BUN/CREAT SERPL: 23 (ref 12–28)
CALCIUM SERPL-MCNC: 9.1 MG/DL (ref 8.7–10.3)
CHLORIDE SERPL-SCNC: 103 MMOL/L (ref 96–106)
CO2 SERPL-SCNC: 20 MMOL/L (ref 20–29)
CREAT SERPL-MCNC: 0.87 MG/DL (ref 0.57–1)
CRP SERPL-MCNC: 16 MG/L (ref 0–10)
EOSINOPHIL # BLD AUTO: 0.2 X10E3/UL (ref 0–0.4)
EOSINOPHIL # BLD AUTO: 0.2 X10E3/UL (ref 0–0.4)
EOSINOPHIL NFR BLD AUTO: 2 %
EOSINOPHIL NFR BLD AUTO: 2 %
ERYTHROCYTE [DISTWIDTH] IN BLOOD BY AUTOMATED COUNT: 13.6 % (ref 12.3–15.4)
ERYTHROCYTE [DISTWIDTH] IN BLOOD BY AUTOMATED COUNT: 14.1 % (ref 12.3–15.4)
ERYTHROCYTE [SEDIMENTATION RATE] IN BLOOD BY WESTERGREN METHOD: 14 MM/HR (ref 0–40)
FERRITIN SERPL-MCNC: 52 NG/ML (ref 15–150)
GLOBULIN SER CALC-MCNC: 2.2 G/DL (ref 1.5–4.5)
GLUCOSE SERPL-MCNC: 86 MG/DL (ref 65–99)
HCT VFR BLD AUTO: 33.2 % (ref 34–46.6)
HCT VFR BLD AUTO: 37.6 % (ref 34–46.6)
HGB BLD-MCNC: 11.5 G/DL (ref 11.1–15.9)
HGB BLD-MCNC: 12.8 G/DL (ref 11.1–15.9)
IMM GRANULOCYTES # BLD AUTO: 0 X10E3/UL (ref 0–0.1)
IMM GRANULOCYTES # BLD AUTO: 0 X10E3/UL (ref 0–0.1)
IMM GRANULOCYTES NFR BLD AUTO: 0 %
IMM GRANULOCYTES NFR BLD AUTO: 0 %
IRON SATN MFR SERPL: 9 % (ref 15–55)
IRON SERPL-MCNC: 32 UG/DL (ref 27–139)
LACTATE SERPL-MCNC: NORMAL MG/DL
LYMPHOCYTES # BLD AUTO: 2.1 X10E3/UL (ref 0.7–3.1)
LYMPHOCYTES # BLD AUTO: 2.3 X10E3/UL (ref 0.7–3.1)
LYMPHOCYTES NFR BLD AUTO: 26 %
LYMPHOCYTES NFR BLD AUTO: 29 %
Lab: NORMAL
MCH RBC QN AUTO: 28.9 PG (ref 26.6–33)
MCH RBC QN AUTO: 29.3 PG (ref 26.6–33)
MCHC RBC AUTO-ENTMCNC: 34 G/DL (ref 31.5–35.7)
MCHC RBC AUTO-ENTMCNC: 34.6 G/DL (ref 31.5–35.7)
MCV RBC AUTO: 83 FL (ref 79–97)
MCV RBC AUTO: 86 FL (ref 79–97)
MONOCYTES # BLD AUTO: 0.5 X10E3/UL (ref 0.1–0.9)
MONOCYTES # BLD AUTO: 0.6 X10E3/UL (ref 0.1–0.9)
MONOCYTES NFR BLD AUTO: 6 %
MONOCYTES NFR BLD AUTO: 7 %
NEUTROPHILS # BLD AUTO: 4.7 X10E3/UL (ref 1.4–7)
NEUTROPHILS # BLD AUTO: 5.3 X10E3/UL (ref 1.4–7)
NEUTROPHILS NFR BLD AUTO: 62 %
NEUTROPHILS NFR BLD AUTO: 66 %
OTHER ANTIBIOTIC SUSC ISLT: ABNORMAL
PLATELET # BLD AUTO: 189 X10E3/UL (ref 150–450)
PLATELET # BLD AUTO: 219 X10E3/UL (ref 150–450)
POTASSIUM SERPL-SCNC: 5.3 MMOL/L (ref 3.5–5.2)
PROT SERPL-MCNC: 6.4 G/DL (ref 6–8.5)
RBC # BLD AUTO: 3.98 X10E6/UL (ref 3.77–5.28)
RBC # BLD AUTO: 4.37 X10E6/UL (ref 3.77–5.28)
SODIUM SERPL-SCNC: 141 MMOL/L (ref 134–144)
SPECIMEN STATUS: NORMAL
TIBC SERPL-MCNC: 342 UG/DL (ref 250–450)
UIBC SERPL-MCNC: 310 UG/DL (ref 118–369)
WBC # BLD AUTO: 7.8 X10E3/UL (ref 3.4–10.8)
WBC # BLD AUTO: 8.1 X10E3/UL (ref 3.4–10.8)

## 2019-01-01 PROCEDURE — G0439 PPPS, SUBSEQ VISIT: HCPCS | Performed by: NURSE PRACTITIONER

## 2019-01-01 PROCEDURE — 96372 THER/PROPH/DIAG INJ SC/IM: CPT | Performed by: NURSE PRACTITIONER

## 2019-01-01 PROCEDURE — 99214 OFFICE O/P EST MOD 30 MIN: CPT | Performed by: NURSE PRACTITIONER

## 2019-01-01 PROCEDURE — 99213 OFFICE O/P EST LOW 20 MIN: CPT | Performed by: NURSE PRACTITIONER

## 2019-01-01 PROCEDURE — G0008 ADMIN INFLUENZA VIRUS VAC: HCPCS | Performed by: NURSE PRACTITIONER

## 2019-01-01 PROCEDURE — 90653 IIV ADJUVANT VACCINE IM: CPT | Performed by: NURSE PRACTITIONER

## 2019-01-01 RX ORDER — POTASSIUM CHLORIDE 750 MG/1
TABLET, FILM COATED, EXTENDED RELEASE ORAL
Qty: 60 TABLET | Refills: 0 | Status: SHIPPED | OUTPATIENT
Start: 2019-01-01 | End: 2019-01-01 | Stop reason: SDUPTHER

## 2019-01-01 RX ORDER — POTASSIUM CHLORIDE 750 MG/1
TABLET, FILM COATED, EXTENDED RELEASE ORAL
Qty: 60 TABLET | Refills: 0 | Status: SHIPPED | OUTPATIENT
Start: 2019-01-01 | End: 2020-01-01

## 2019-01-01 RX ORDER — BUSPIRONE HYDROCHLORIDE 10 MG/1
10 TABLET ORAL 3 TIMES DAILY
Qty: 90 TABLET | Refills: 0 | Status: SHIPPED | OUTPATIENT
Start: 2019-01-01 | End: 2020-01-01

## 2019-01-01 RX ORDER — MUPIROCIN CALCIUM 20 MG/G
CREAM TOPICAL 3 TIMES DAILY
Qty: 30 G | Refills: 0 | Status: SHIPPED | OUTPATIENT
Start: 2019-01-01 | End: 2019-01-01 | Stop reason: SDUPTHER

## 2019-01-01 RX ORDER — GALANTAMINE HYDROBROMIDE 8 MG/1
8 TABLET, FILM COATED ORAL 2 TIMES DAILY
Qty: 60 TABLET | Refills: 0 | Status: SHIPPED | OUTPATIENT
Start: 2019-01-01 | End: 2019-01-01 | Stop reason: SDUPTHER

## 2019-01-01 RX ORDER — MIRTAZAPINE 45 MG/1
TABLET, FILM COATED ORAL
Qty: 30 TABLET | Refills: 0 | OUTPATIENT
Start: 2019-01-01

## 2019-01-01 RX ORDER — DOXYCYCLINE HYCLATE 100 MG/1
100 CAPSULE ORAL 2 TIMES DAILY
Qty: 14 CAPSULE | Refills: 0 | Status: SHIPPED | OUTPATIENT
Start: 2019-01-01 | End: 2020-01-01

## 2019-01-01 RX ORDER — BUSPIRONE HYDROCHLORIDE 5 MG/1
TABLET ORAL
Qty: 270 TABLET | Refills: 0 | Status: SHIPPED | OUTPATIENT
Start: 2019-01-01 | End: 2019-01-01 | Stop reason: SDUPTHER

## 2019-01-01 RX ORDER — MIRTAZAPINE 45 MG/1
TABLET, FILM COATED ORAL
Qty: 30 TABLET | Refills: 0 | Status: SHIPPED | OUTPATIENT
Start: 2019-01-01 | End: 2019-01-01 | Stop reason: SDUPTHER

## 2019-01-01 RX ORDER — POTASSIUM CHLORIDE 750 MG/1
TABLET, FILM COATED, EXTENDED RELEASE ORAL
Qty: 60 TABLET | Refills: 1 | Status: SHIPPED | OUTPATIENT
Start: 2019-01-01 | End: 2019-01-01 | Stop reason: SDUPTHER

## 2019-01-01 RX ORDER — GALANTAMINE HYDROBROMIDE 8 MG/1
TABLET, FILM COATED ORAL
Qty: 60 TABLET | Refills: 0 | Status: SHIPPED | OUTPATIENT
Start: 2019-01-01 | End: 2019-01-01 | Stop reason: SDUPTHER

## 2019-01-01 RX ORDER — DONEPEZIL HYDROCHLORIDE 10 MG/1
TABLET, FILM COATED ORAL
Qty: 30 TABLET | Refills: 2 | Status: SHIPPED | OUTPATIENT
Start: 2019-01-01 | End: 2019-01-01

## 2019-01-01 RX ORDER — GALANTAMINE HYDROBROMIDE 12 MG/1
12 TABLET, FILM COATED ORAL 2 TIMES DAILY
Qty: 60 TABLET | Refills: 0 | Status: SHIPPED | OUTPATIENT
Start: 2019-01-01 | End: 2020-01-01

## 2019-01-01 RX ORDER — TRIAMCINOLONE ACETONIDE 0.25 MG/G
OINTMENT TOPICAL 2 TIMES DAILY
Qty: 80 G | Refills: 0 | Status: SHIPPED | OUTPATIENT
Start: 2019-01-01 | End: 2020-01-01

## 2019-01-01 RX ORDER — MIRTAZAPINE 45 MG/1
TABLET, FILM COATED ORAL
Qty: 30 TABLET | Refills: 2 | Status: SHIPPED | OUTPATIENT
Start: 2019-01-01 | End: 2019-01-01 | Stop reason: SDUPTHER

## 2019-01-01 RX ORDER — NYSTATIN 100000 U/G
CREAM TOPICAL 2 TIMES DAILY
Qty: 30 G | Refills: 3 | Status: SHIPPED | OUTPATIENT
Start: 2019-01-01 | End: 2020-01-01

## 2019-01-01 RX ORDER — MIRTAZAPINE 30 MG/1
30 TABLET, FILM COATED ORAL NIGHTLY
Qty: 30 TABLET | Refills: 0 | Status: SHIPPED | OUTPATIENT
Start: 2019-01-01 | End: 2020-01-01

## 2019-01-01 RX ORDER — MIRTAZAPINE 45 MG/1
TABLET, FILM COATED ORAL
Qty: 30 TABLET | Refills: 1 | Status: SHIPPED | OUTPATIENT
Start: 2019-01-01 | End: 2019-01-01 | Stop reason: SDUPTHER

## 2019-01-01 RX ORDER — GALANTAMINE HYDROBROMIDE 8 MG/1
TABLET, FILM COATED ORAL
Qty: 60 TABLET | Refills: 0 | OUTPATIENT
Start: 2019-01-01

## 2019-01-01 RX ORDER — MUPIROCIN CALCIUM 20 MG/G
CREAM TOPICAL 3 TIMES DAILY
Qty: 30 G | Refills: 3 | Status: SHIPPED | OUTPATIENT
Start: 2019-01-01 | End: 2020-01-01

## 2019-01-01 RX ORDER — GALANTAMINE HYDROBROMIDE 4 MG/1
TABLET, FILM COATED ORAL
Qty: 60 TABLET | Refills: 0 | OUTPATIENT
Start: 2019-01-01

## 2019-01-01 RX ORDER — GALANTAMINE HYDROBROMIDE 4 MG/1
4 TABLET, FILM COATED ORAL 2 TIMES DAILY
Qty: 60 TABLET | Refills: 0 | Status: SHIPPED | OUTPATIENT
Start: 2019-01-01 | End: 2019-01-01 | Stop reason: SDUPTHER

## 2019-01-01 RX ADMIN — CYANOCOBALAMIN 1000 MCG: 1000 INJECTION, SOLUTION INTRAMUSCULAR; SUBCUTANEOUS at 16:04

## 2019-01-01 RX ADMIN — CYANOCOBALAMIN 1000 MCG: 1000 INJECTION, SOLUTION INTRAMUSCULAR; SUBCUTANEOUS at 14:15

## 2019-01-01 RX ADMIN — CYANOCOBALAMIN 1000 MCG: 1000 INJECTION, SOLUTION INTRAMUSCULAR; SUBCUTANEOUS at 14:22

## 2019-01-01 RX ADMIN — CYANOCOBALAMIN 1000 MCG: 1000 INJECTION, SOLUTION INTRAMUSCULAR; SUBCUTANEOUS at 15:36

## 2019-01-01 RX ADMIN — CYANOCOBALAMIN 1000 MCG: 1000 INJECTION, SOLUTION INTRAMUSCULAR; SUBCUTANEOUS at 15:52

## 2019-01-01 RX ADMIN — CYANOCOBALAMIN 1000 MCG: 1000 INJECTION, SOLUTION INTRAMUSCULAR; SUBCUTANEOUS at 15:41

## 2019-01-02 RX ORDER — BUSPIRONE HYDROCHLORIDE 5 MG/1
TABLET ORAL
Qty: 90 TABLET | Refills: 0 | Status: SHIPPED | OUTPATIENT
Start: 2019-01-02 | End: 2019-02-17 | Stop reason: SDUPTHER

## 2019-01-25 ENCOUNTER — OFFICE VISIT (OUTPATIENT)
Dept: FAMILY MEDICINE CLINIC | Facility: CLINIC | Age: 84
End: 2019-01-25

## 2019-01-25 VITALS
WEIGHT: 116 LBS | HEIGHT: 59 IN | HEART RATE: 99 BPM | SYSTOLIC BLOOD PRESSURE: 114 MMHG | DIASTOLIC BLOOD PRESSURE: 74 MMHG | OXYGEN SATURATION: 98 % | TEMPERATURE: 98.5 F | BODY MASS INDEX: 23.39 KG/M2

## 2019-01-25 DIAGNOSIS — F43.21 COMPLICATED GRIEF: Primary | ICD-10-CM

## 2019-01-25 DIAGNOSIS — R41.89 COGNITIVE AND BEHAVIORAL CHANGES: ICD-10-CM

## 2019-01-25 DIAGNOSIS — I48.21 PERMANENT ATRIAL FIBRILLATION (HCC): ICD-10-CM

## 2019-01-25 DIAGNOSIS — E44.0 PROTEIN-CALORIE MALNUTRITION, MODERATE (HCC): ICD-10-CM

## 2019-01-25 DIAGNOSIS — R46.89 COGNITIVE AND BEHAVIORAL CHANGES: ICD-10-CM

## 2019-01-25 DIAGNOSIS — E53.8 B12 DEFICIENCY: ICD-10-CM

## 2019-01-25 DIAGNOSIS — I50.32 CHRONIC DIASTOLIC HEART FAILURE (HCC): ICD-10-CM

## 2019-01-25 DIAGNOSIS — M84.48XS SACRAL INSUFFICIENCY FRACTURE, SEQUELA: ICD-10-CM

## 2019-01-25 PROBLEM — C55 MALIGNANT NEOPLASM OF UTERUS (HCC): Status: RESOLVED | Noted: 2017-01-11 | Resolved: 2019-01-25

## 2019-01-25 PROCEDURE — 96372 THER/PROPH/DIAG INJ SC/IM: CPT | Performed by: NURSE PRACTITIONER

## 2019-01-25 PROCEDURE — 99214 OFFICE O/P EST MOD 30 MIN: CPT | Performed by: NURSE PRACTITIONER

## 2019-01-25 RX ORDER — DONEPEZIL HYDROCHLORIDE 5 MG/1
5 TABLET, FILM COATED ORAL NIGHTLY
Qty: 30 TABLET | Refills: 3 | Status: SHIPPED | OUTPATIENT
Start: 2019-01-25 | End: 2019-03-04 | Stop reason: SDUPTHER

## 2019-01-25 RX ADMIN — CYANOCOBALAMIN 1000 MCG: 1000 INJECTION, SOLUTION INTRAMUSCULAR; SUBCUTANEOUS at 14:49

## 2019-01-25 NOTE — PROGRESS NOTES
"Bita Anderson is a 83 y.o. female who presents for a one month follow up on grief, malnutrition. Was dx with spinal stenosis recently.     History of Present Illness   At night only taking 1/2 of mirtazapine, still not sleeping like she should. Forgetting a lot of things recently. Having inappropriate comments.   Has lost weight, 112 lbs at home. Daughter giving extra water pill if needed. Not eating well secondary to stress of back pain. Pain primarily in RLE  Newer SIF fracture. No PT until sees spine doctor. Has been applying heat. Taking tylenol rare for pain.   The following portions of the patient's history were reviewed and updated as appropriate: allergies, current medications, past family history, past medical history, past social history, past surgical history and problem list.    Review of Systems   Constitutional: Positive for appetite change. Negative for activity change, unexpected weight gain and unexpected weight loss.   Respiratory: Negative for shortness of breath.    Cardiovascular: Positive for leg swelling (wearing compression).   Musculoskeletal: Positive for arthralgias, back pain and gait problem. Negative for myalgias.   Neurological: Positive for confusion. Negative for light-headedness.   Psychiatric/Behavioral: Positive for sleep disturbance and depressed mood. Negative for self-injury and suicidal ideas. The patient is nervous/anxious.      /74   Pulse 99   Temp 98.5 °F (36.9 °C) (Oral)   Ht 149.9 cm (59\")   Wt 52.6 kg (116 lb)   SpO2 98%   BMI 23.43 kg/m²     Objective   Physical Exam   Constitutional: She appears cachectic.   Neck: Normal range of motion. Neck supple. No tracheal deviation present. No thyromegaly present.   Cardiovascular: Normal rate. An irregularly irregular rhythm present.   Murmur heard.   Systolic murmur is present with a grade of 1/6.  Pulses:       Radial pulses are 2+ on the right side, and 2+ on the left side.   Pulmonary/Chest: Effort " normal and breath sounds normal.   Lymphadenopathy:     She has no cervical adenopathy.   Skin: Skin is warm and dry.   Psychiatric: Thought content normal. Her mood appears anxious. Her speech is tangential. She is withdrawn. She expresses impulsivity. She exhibits a depressed mood. She exhibits abnormal recent memory. She is inattentive.   Nursing note and vitals reviewed.    Assessment/Plan   Problems Addressed this Visit        Cardiovascular and Mediastinum    Permanent atrial fibrillation (CMS/HCC)       Digestive    B12 deficiency    Protein-calorie malnutrition, moderate (CMS/HCC)       Other    Complicated grief - Primary    Cognitive and behavioral changes      Other Visit Diagnoses     Chronic diastolic heart failure (CMS/HCC)        Sacral insufficiency fracture, sequela            PAF--continue to work with cardiology, continue anticoagulation  B12 deficiency--continue monthly injections  Malnutrition--daughter providing meals, refuses supplements. Continue remeron  Complicate grief, now with cognitive changes--start donepezil, continue remeron  CHF--no sign of overload today, continue diuretics.   Leg pain--SIF fracture--Needs to take tylenol for the pain 1 every 6 hours for the pain.  Await neurosurgery opinion. Discussed the LSS not new, had done well with epidurals in the past. If SIF fracture deemed old, may consider repeat injections, but with edema associated on imaging, has a more acute presentation    To get in with spine specialist 2/4/19    MRI L-spine was reviewed from 1/21/19 and shows:  1. Sacral Insufficiency fracture.  2. Degenerative disease L4-5 with worsening severe canal stenosis  3. Severe canal stenosis L3-4  4. Mild compression fracture L1, chronic today but didn't appear in 2013.

## 2019-02-18 RX ORDER — BUSPIRONE HYDROCHLORIDE 5 MG/1
TABLET ORAL
Qty: 90 TABLET | Refills: 0 | Status: SHIPPED | OUTPATIENT
Start: 2019-02-18 | End: 2019-03-16 | Stop reason: SDUPTHER

## 2019-02-26 ENCOUNTER — OFFICE VISIT (OUTPATIENT)
Dept: FAMILY MEDICINE CLINIC | Facility: CLINIC | Age: 84
End: 2019-02-26

## 2019-02-26 VITALS
SYSTOLIC BLOOD PRESSURE: 118 MMHG | HEIGHT: 59 IN | BODY MASS INDEX: 22.58 KG/M2 | HEART RATE: 88 BPM | OXYGEN SATURATION: 98 % | TEMPERATURE: 97.8 F | WEIGHT: 112 LBS | DIASTOLIC BLOOD PRESSURE: 72 MMHG

## 2019-02-26 DIAGNOSIS — D51.9 ANEMIA DUE TO VITAMIN B12 DEFICIENCY, UNSPECIFIED B12 DEFICIENCY TYPE: Primary | ICD-10-CM

## 2019-02-26 DIAGNOSIS — R41.89 COGNITIVE AND BEHAVIORAL CHANGES: ICD-10-CM

## 2019-02-26 DIAGNOSIS — E44.0 PROTEIN-CALORIE MALNUTRITION, MODERATE (HCC): ICD-10-CM

## 2019-02-26 DIAGNOSIS — F43.21 COMPLICATED GRIEF: ICD-10-CM

## 2019-02-26 DIAGNOSIS — R46.89 COGNITIVE AND BEHAVIORAL CHANGES: ICD-10-CM

## 2019-02-26 DIAGNOSIS — E78.2 MIXED HYPERLIPIDEMIA: ICD-10-CM

## 2019-02-26 PROCEDURE — 99214 OFFICE O/P EST MOD 30 MIN: CPT | Performed by: NURSE PRACTITIONER

## 2019-02-26 PROCEDURE — 96372 THER/PROPH/DIAG INJ SC/IM: CPT | Performed by: NURSE PRACTITIONER

## 2019-02-26 RX ORDER — MIRTAZAPINE 45 MG/1
45 TABLET, FILM COATED ORAL NIGHTLY
Qty: 30 TABLET | Refills: 3 | Status: SHIPPED | OUTPATIENT
Start: 2019-02-26 | End: 2019-01-01 | Stop reason: SDUPTHER

## 2019-02-26 RX ADMIN — CYANOCOBALAMIN 1000 MCG: 1000 INJECTION, SOLUTION INTRAMUSCULAR; SUBCUTANEOUS at 15:28

## 2019-02-26 NOTE — PROGRESS NOTES
"Subjective   Chiquita Anderson is a 83 y.o. female who presents for a 1 month follow up on complicated grief.     History of Present Illness   Started aricept and noted sleep is interrupted by covers, bladder, though CHF APRN has decreased her diuretics. (ie overall no changes) Not eating right, appetite has dropped off since starting aricept. Pt denies depression, though admits thinks about nothing besides the loss of her  3 yrs ago. Seen today with daughter Maite. Has had a 4 lb weight loss. Pt denies memory issues.   Had recent injection in low back, some improvement to low back pain.   The following portions of the patient's history were reviewed and updated as appropriate: allergies, current medications, past family history, past medical history, past social history, past surgical history and problem list.    Review of Systems   Constitutional: Positive for fatigue and unexpected weight loss.   HENT: Positive for nosebleeds (sunday, stopped fairly quickly with a cold cloth).    Cardiovascular: Positive for leg swelling (trace).   Genitourinary: Positive for urinary incontinence and urgency.   Musculoskeletal: Positive for arthralgias, back pain and gait problem.   Neurological: Positive for memory problem.   Hematological: Bruises/bleeds easily.   Psychiatric/Behavioral: Positive for behavioral problems, sleep disturbance and depressed mood. The patient is nervous/anxious.      /72   Pulse 88   Temp 97.8 °F (36.6 °C) (Oral)   Ht 149.9 cm (59\")   Wt 50.8 kg (112 lb)   SpO2 98%   BMI 22.62 kg/m²     Objective   Physical Exam   Constitutional: She appears well-developed and well-nourished.   Neck: Normal range of motion. Neck supple. No thyromegaly present.   Cardiovascular: Normal rate and normal heart sounds. An irregularly irregular rhythm present.   Pulmonary/Chest: Effort normal and breath sounds normal.   Lymphadenopathy:     She has no cervical adenopathy.   Skin: Skin is warm and dry. "   Psychiatric: Her mood appears anxious. Her speech is tangential. Cognition and memory are impaired. She expresses inappropriate judgment. She exhibits a depressed mood. She exhibits abnormal recent memory.   Nursing note and vitals reviewed.    Assessment/Plan   Problems Addressed this Visit        Cardiovascular and Mediastinum    Hyperlipidemia    Relevant Orders    Lipid Panel       Digestive    B12 deficiency anemia - Primary    Relevant Orders    CBC & Differential    Vitamin B12    Protein-calorie malnutrition, moderate (CMS/HCC)    Relevant Orders    TSH    Comprehensive Metabolic Panel       Other    Complicated grief    Relevant Medications    mirtazapine (REMERON) 45 MG tablet    Cognitive and behavioral changes        HLD--update labs  B12 anemia--Hgb stays around 9  PCM--losing weight--increase mirtzazpine, check labs, encourage frequent high protein snacks, likes sweets instead  Cognitive and behavioral changes--continue low dose aricept for now--but if doesn't start regaining weight may have to taper off. Looking back spouse covered for her cognitive deficits, now gone for 3 yrs, depression has complicated greatly. Discussed options at length with pt and daughter.   Osteoporosis--needs to complete dexa as ordered. Start bisphosphonate--likely reclast.

## 2019-02-27 LAB
ALBUMIN SERPL-MCNC: 4.2 G/DL (ref 3.5–4.7)
ALBUMIN/GLOB SERPL: 2 {RATIO} (ref 1.2–2.2)
ALP SERPL-CCNC: 187 IU/L (ref 39–117)
ALT SERPL-CCNC: 13 IU/L (ref 0–32)
AST SERPL-CCNC: 22 IU/L (ref 0–40)
BASOPHILS # BLD AUTO: 0 X10E3/UL (ref 0–0.2)
BASOPHILS NFR BLD AUTO: 0 %
BILIRUB SERPL-MCNC: 0.6 MG/DL (ref 0–1.2)
BUN SERPL-MCNC: 15 MG/DL (ref 8–27)
BUN/CREAT SERPL: 20 (ref 12–28)
CALCIUM SERPL-MCNC: 9 MG/DL (ref 8.7–10.3)
CHLORIDE SERPL-SCNC: 100 MMOL/L (ref 96–106)
CHOLEST SERPL-MCNC: 196 MG/DL (ref 100–199)
CO2 SERPL-SCNC: 26 MMOL/L (ref 20–29)
CREAT SERPL-MCNC: 0.74 MG/DL (ref 0.57–1)
EOSINOPHIL # BLD AUTO: 0.2 X10E3/UL (ref 0–0.4)
EOSINOPHIL NFR BLD AUTO: 2 %
ERYTHROCYTE [DISTWIDTH] IN BLOOD BY AUTOMATED COUNT: 16.4 % (ref 12.3–15.4)
GLOBULIN SER CALC-MCNC: 2.1 G/DL (ref 1.5–4.5)
GLUCOSE SERPL-MCNC: 88 MG/DL (ref 65–99)
HCT VFR BLD AUTO: 35.8 % (ref 34–46.6)
HDLC SERPL-MCNC: 73 MG/DL
HGB BLD-MCNC: 11.7 G/DL (ref 11.1–15.9)
IMM GRANULOCYTES # BLD AUTO: 0 X10E3/UL (ref 0–0.1)
IMM GRANULOCYTES NFR BLD AUTO: 0 %
LDLC SERPL CALC-MCNC: 111 MG/DL (ref 0–99)
LYMPHOCYTES # BLD AUTO: 2 X10E3/UL (ref 0.7–3.1)
LYMPHOCYTES NFR BLD AUTO: 26 %
MCH RBC QN AUTO: 26.7 PG (ref 26.6–33)
MCHC RBC AUTO-ENTMCNC: 32.7 G/DL (ref 31.5–35.7)
MCV RBC AUTO: 82 FL (ref 79–97)
MONOCYTES # BLD AUTO: 0.6 X10E3/UL (ref 0.1–0.9)
MONOCYTES NFR BLD AUTO: 8 %
NEUTROPHILS # BLD AUTO: 4.8 X10E3/UL (ref 1.4–7)
NEUTROPHILS NFR BLD AUTO: 64 %
PLATELET # BLD AUTO: 261 X10E3/UL (ref 150–379)
POTASSIUM SERPL-SCNC: 4.4 MMOL/L (ref 3.5–5.2)
PROT SERPL-MCNC: 6.3 G/DL (ref 6–8.5)
RBC # BLD AUTO: 4.38 X10E6/UL (ref 3.77–5.28)
SODIUM SERPL-SCNC: 140 MMOL/L (ref 134–144)
TRIGL SERPL-MCNC: 60 MG/DL (ref 0–149)
TSH SERPL DL<=0.005 MIU/L-ACNC: 0.72 UIU/ML (ref 0.45–4.5)
VIT B12 SERPL-MCNC: >2000 PG/ML (ref 232–1245)
VLDLC SERPL CALC-MCNC: 12 MG/DL (ref 5–40)
WBC # BLD AUTO: 7.6 X10E3/UL (ref 3.4–10.8)

## 2019-02-28 LAB
FERRITIN SERPL-MCNC: 101 NG/ML (ref 15–150)
IRON SERPL-MCNC: 68 UG/DL (ref 27–139)
Lab: NORMAL
WRITTEN AUTHORIZATION: NORMAL

## 2019-03-04 ENCOUNTER — OFFICE VISIT (OUTPATIENT)
Dept: FAMILY MEDICINE CLINIC | Facility: CLINIC | Age: 84
End: 2019-03-04

## 2019-03-04 VITALS
DIASTOLIC BLOOD PRESSURE: 60 MMHG | OXYGEN SATURATION: 93 % | HEIGHT: 59 IN | SYSTOLIC BLOOD PRESSURE: 110 MMHG | BODY MASS INDEX: 22.78 KG/M2 | WEIGHT: 113 LBS | HEART RATE: 87 BPM | TEMPERATURE: 97.9 F

## 2019-03-04 DIAGNOSIS — M80.00XD AGE-RELATED OSTEOPOROSIS WITH CURRENT PATHOLOGICAL FRACTURE WITH ROUTINE HEALING, SUBSEQUENT ENCOUNTER: ICD-10-CM

## 2019-03-04 DIAGNOSIS — R46.89 COGNITIVE AND BEHAVIORAL CHANGES: Primary | ICD-10-CM

## 2019-03-04 DIAGNOSIS — R41.89 COGNITIVE AND BEHAVIORAL CHANGES: Primary | ICD-10-CM

## 2019-03-04 DIAGNOSIS — R23.8 SKIN BREAKDOWN: ICD-10-CM

## 2019-03-04 PROBLEM — M80.00XA AGE-RELATED OSTEOPOROSIS WITH CURRENT PATHOLOGICAL FRACTURE: Status: ACTIVE | Noted: 2019-02-28

## 2019-03-04 PROBLEM — M48.062 SPINAL STENOSIS OF LUMBAR REGION WITH NEUROGENIC CLAUDICATION: Status: ACTIVE | Noted: 2019-02-28

## 2019-03-04 PROCEDURE — 99214 OFFICE O/P EST MOD 30 MIN: CPT | Performed by: NURSE PRACTITIONER

## 2019-03-04 RX ORDER — DONEPEZIL HYDROCHLORIDE 10 MG/1
10 TABLET, FILM COATED ORAL NIGHTLY
Qty: 30 TABLET | Refills: 3 | Status: SHIPPED | OUTPATIENT
Start: 2019-03-04 | End: 2019-01-01 | Stop reason: SDUPTHER

## 2019-03-04 NOTE — PROGRESS NOTES
"Bita Anderson is a 83 y.o. female who presents to discuss treatment for osteoporosis. Also wants to discuss increasing aricept.     History of Present Illness   Did go to get DEXA in August. Currently with SIF fracture. Previous treatment for osteoporosis included prolia.  Daughter reports lots of memory issues in her mother. She is getting frustrated in trying to communicate with her mother, needing to repeat self multiple times to be understood. This is worse in the evenings when she is fatigued.     The following portions of the patient's history were reviewed and updated as appropriate: allergies, current medications, past family history, past medical history, past social history, past surgical history and problem list.    Review of Systems   Constitutional: Positive for fatigue and unexpected weight gain (2 lbs overnight). Negative for activity change, appetite change and unexpected weight loss.   Gastrointestinal: Positive for indigestion (occ, better with milk, takes famotidine 1 x daily).   Musculoskeletal: Positive for back pain and gait problem.   Skin: Positive for color change and rash (worried pressure sores).   Neurological: Positive for memory problem.   Hematological: Bruises/bleeds easily.   Psychiatric/Behavioral: Positive for behavioral problems and sleep disturbance. Negative for suicidal ideas.     /60   Pulse 87   Temp 97.9 °F (36.6 °C) (Oral)   Ht 149.9 cm (59\")   Wt 51.3 kg (113 lb)   SpO2 93%   BMI 22.82 kg/m²     Objective   Physical Exam   Constitutional: She appears well-developed and well-nourished. No distress.   Cardiovascular: Normal rate.   Pulmonary/Chest: Effort normal.   Skin: Skin is warm and dry. Capillary refill takes less than 2 seconds. Turgor is normal. Ecchymosis noted. No abrasion and no rash noted. No cyanosis. Nails show no clubbing.   Gluteal folds with redness no maceration, no open areas   Psychiatric: Her speech is normal and behavior is " normal. Thought content normal. Cognition and memory are impaired. She expresses inappropriate judgment. She exhibits a depressed mood.   Nursing note and vitals reviewed.    Assessment/Plan   Problems Addressed this Visit        Musculoskeletal and Integument    Age-related osteoporosis with current pathological fracture    Relevant Orders    Ambulatory Referral to [facility] For Infusion Treatment (Completed)       Other    Cognitive and behavioral changes - Primary    Relevant Medications    donepezil (ARICEPT) 10 MG tablet    Other Relevant Orders    Ambulatory Referral to [facility] For Infusion Treatment (Completed)      Other Visit Diagnoses     Skin breakdown        Relevant Orders    Ambulatory Referral to [facility] For Infusion Treatment (Completed)        Would apply barrier cream to buttocks 2 x daily.  Did get DEXA done last year. Will restart prolia injections T score -3.3 at L femoral neck  Cognitive changes--increase aricept. Watch for further weight loss.   1/21/19 MRI demonstrates SIF fracture, LSS

## 2019-03-05 ENCOUNTER — TELEPHONE (OUTPATIENT)
Dept: FAMILY MEDICINE CLINIC | Facility: CLINIC | Age: 84
End: 2019-03-05

## 2019-03-05 NOTE — TELEPHONE ENCOUNTER
Daughter wants clarification that its okay to stay off iron tablets until next FU in 3 months and reevaluate at that time.     [phone]

## 2019-03-11 RX ORDER — POTASSIUM CHLORIDE 750 MG/1
TABLET, FILM COATED, EXTENDED RELEASE ORAL
Qty: 60 TABLET | Refills: 2 | Status: SHIPPED | OUTPATIENT
Start: 2019-03-11 | End: 2019-01-01 | Stop reason: SDUPTHER

## 2019-03-15 ENCOUNTER — TELEPHONE (OUTPATIENT)
Dept: FAMILY MEDICINE CLINIC | Facility: CLINIC | Age: 84
End: 2019-03-15

## 2019-03-15 DIAGNOSIS — M81.0 OSTEOPOROSIS, POSTMENOPAUSAL: Primary | ICD-10-CM

## 2019-03-18 RX ORDER — BUSPIRONE HYDROCHLORIDE 5 MG/1
TABLET ORAL
Qty: 90 TABLET | Refills: 0 | Status: SHIPPED | OUTPATIENT
Start: 2019-03-18 | End: 2019-04-27 | Stop reason: SDUPTHER

## 2019-03-19 ENCOUNTER — TELEPHONE (OUTPATIENT)
Dept: FAMILY MEDICINE CLINIC | Facility: CLINIC | Age: 84
End: 2019-03-19

## 2019-03-19 DIAGNOSIS — M81.0 POST-MENOPAUSAL OSTEOPOROSIS: Primary | ICD-10-CM

## 2019-03-19 NOTE — TELEPHONE ENCOUNTER
I tried to edit this order and it didn't work. Can you please add a new order for prolia 60mg sq x 1 for post-menopausal osteoporosis with calcium level drawn prior. Thank you.

## 2019-03-19 NOTE — TELEPHONE ENCOUNTER
I tried to edit the order but it didn't work. Mili is going to enter a new order for me with this information.

## 2019-03-28 ENCOUNTER — CLINICAL SUPPORT (OUTPATIENT)
Dept: FAMILY MEDICINE CLINIC | Facility: CLINIC | Age: 84
End: 2019-03-28

## 2019-03-28 DIAGNOSIS — E53.8 B12 DEFICIENCY: ICD-10-CM

## 2019-03-28 PROCEDURE — 96372 THER/PROPH/DIAG INJ SC/IM: CPT | Performed by: NURSE PRACTITIONER

## 2019-03-28 RX ADMIN — CYANOCOBALAMIN 1000 MCG: 1000 INJECTION, SOLUTION INTRAMUSCULAR; SUBCUTANEOUS at 14:36

## 2019-04-29 ENCOUNTER — OFFICE VISIT (OUTPATIENT)
Dept: FAMILY MEDICINE CLINIC | Facility: CLINIC | Age: 84
End: 2019-04-29

## 2019-04-29 VITALS
OXYGEN SATURATION: 98 % | WEIGHT: 119 LBS | TEMPERATURE: 98.5 F | HEART RATE: 98 BPM | SYSTOLIC BLOOD PRESSURE: 112 MMHG | HEIGHT: 59 IN | DIASTOLIC BLOOD PRESSURE: 78 MMHG | BODY MASS INDEX: 23.99 KG/M2

## 2019-04-29 DIAGNOSIS — S81.811A LACERATION OF SKIN OF RIGHT LOWER LEG, INITIAL ENCOUNTER: Primary | ICD-10-CM

## 2019-04-29 DIAGNOSIS — E53.8 B12 DEFICIENCY: ICD-10-CM

## 2019-04-29 DIAGNOSIS — H61.23 CERUMEN DEBRIS ON TYMPANIC MEMBRANE OF BOTH EARS: ICD-10-CM

## 2019-04-29 DIAGNOSIS — J30.9 ALLERGIC RHINITIS, UNSPECIFIED SEASONALITY, UNSPECIFIED TRIGGER: ICD-10-CM

## 2019-04-29 PROCEDURE — 96372 THER/PROPH/DIAG INJ SC/IM: CPT | Performed by: NURSE PRACTITIONER

## 2019-04-29 PROCEDURE — 99214 OFFICE O/P EST MOD 30 MIN: CPT | Performed by: NURSE PRACTITIONER

## 2019-04-29 PROCEDURE — 90715 TDAP VACCINE 7 YRS/> IM: CPT | Performed by: NURSE PRACTITIONER

## 2019-04-29 PROCEDURE — 90471 IMMUNIZATION ADMIN: CPT | Performed by: NURSE PRACTITIONER

## 2019-04-29 PROCEDURE — 69209 REMOVE IMPACTED EAR WAX UNI: CPT | Performed by: NURSE PRACTITIONER

## 2019-04-29 RX ORDER — FLUTICASONE PROPIONATE 50 MCG
2 SPRAY, SUSPENSION (ML) NASAL DAILY
Qty: 15.8 ML | Refills: 0 | Status: SHIPPED | OUTPATIENT
Start: 2019-04-29 | End: 2019-01-01

## 2019-04-29 RX ORDER — BUSPIRONE HYDROCHLORIDE 5 MG/1
TABLET ORAL
Qty: 90 TABLET | Refills: 2 | Status: SHIPPED | OUTPATIENT
Start: 2019-04-29 | End: 2019-01-01 | Stop reason: SDUPTHER

## 2019-04-29 RX ADMIN — CYANOCOBALAMIN 1000 MCG: 1000 INJECTION, SOLUTION INTRAMUSCULAR; SUBCUTANEOUS at 15:32

## 2019-04-29 NOTE — PROGRESS NOTES
"Bita Anderson is a 83 y.o. female who presents c/o wound to back of right leg after a car door hit leg last week. Also c/o cough, congestion, runny nose.     History of Present Illness   Cough, congestion, and runny nose x 3 days with associated ST. Lesion to back of leg where hit on car door. Has been cleansing with soap and water, applying polysporin.   The following portions of the patient's history were reviewed and updated as appropriate: allergies, current medications, past family history, past medical history, past social history, past surgical history and problem list.    Review of Systems   Constitutional: Negative for chills and fever.   HENT: Positive for congestion, postnasal drip and rhinorrhea. Negative for ear pain, sinus pressure and sore throat.    Respiratory: Positive for cough.    Cardiovascular: Negative.    Gastrointestinal: Negative.    Musculoskeletal: Negative.    Skin: Positive for skin lesions.   Neurological: Negative for headache.        Balance issues   Psychiatric/Behavioral: Negative.      /78   Pulse 98   Temp 98.5 °F (36.9 °C) (Oral)   Ht 149.9 cm (59\")   Wt 54 kg (119 lb)   SpO2 98%   BMI 24.04 kg/m²     Objective   Physical Exam   Constitutional: She appears well-developed and well-nourished. No distress.   HENT:   Right Ear: cerumen impaction is present.  Left Ear: An impacted cerumen is present.  Nose: No mucosal edema. Right sinus exhibits no maxillary sinus tenderness and no frontal sinus tenderness. Left sinus exhibits no maxillary sinus tenderness and no frontal sinus tenderness.   Mouth/Throat: Uvula is midline, oropharynx is clear and moist and mucous membranes are normal.   Neck: Normal range of motion. Neck supple. No tracheal deviation present. No thyromegaly present.   Cardiovascular: Normal rate. An irregularly irregular rhythm present.   Pulmonary/Chest: Effort normal and breath sounds normal.   Lymphadenopathy:     She has no cervical " adenopathy.   Skin: Skin is warm and dry. Capillary refill takes less than 2 seconds. Lesion (back of R calf laceration 1 cm with mild redness,no exudate) noted. No rash noted. No cyanosis. Nails show no clubbing.   Psychiatric: Her mood appears anxious. Her affect is not angry. She exhibits a depressed mood.     Assessment/Plan   Problems Addressed this Visit        Respiratory    Atopic rhinitis       Digestive    B12 deficiency       Musculoskeletal and Integument    Laceration of skin of right lower leg - Primary    Relevant Orders    Tdap Vaccine Greater Than or Equal To 8yo IM (Completed)      Other Visit Diagnoses     Cerumen debris on tympanic membrane of both ears            Allergic rhinitis--trial flonase if nasal symptoms predominate  B12 deficiency--B12 injection today.  Laceration of RLE--update tetanus. Continue to wash with soap and water daily, keep covered apply polysporin daily.   Cerumen--discussed maintenance drops have discussed in the past    Ear Cerumen Removal Instrumentation  Date/Time: 4/29/2019 3:51 PM  Performed by: Bere Kennedy APRN  Authorized by: Bere Kennedy APRN   Consent: Verbal consent obtained.  Risks and benefits: risks, benefits and alternatives were discussed  Consent given by: patient  Patient understanding: patient states understanding of the procedure being performed  Patient consent: the patient's understanding of the procedure matches consent given  Patient identity confirmed: verbally with patient    Anesthesia:  Local Anesthetic: none  Location details: right ear and left ear  Patient tolerance: Patient tolerated the procedure well with no immediate complications  Procedure type: irrigation   Sedation:  Patient sedated: no

## 2019-05-28 NOTE — PROGRESS NOTES
"Subjective   Chiquita Anderson is a 83 y.o. female who presents for a follow up on anemia. Wants iron levels rechecked. Has been off iron for several months.     History of Present Illness   Off iron secondary to iron levels being sufficient. Still not getting enough sleep, goes to sleep, but wakes secondary to restroom. Loses concentration. Walking away from walker.   Missing spouse.   Daughter worried that diastolic is getting too low, but mom denies dizziness.   The following portions of the patient's history were reviewed and updated as appropriate: allergies, current medications, past family history, past medical history, past social history, past surgical history and problem list.    Review of Systems   Constitutional: Positive for fatigue. Negative for chills, fever, unexpected weight gain and unexpected weight loss.   HENT: Negative.    Respiratory: Negative for shortness of breath.    Cardiovascular: Positive for leg swelling.   Gastrointestinal: Negative.    Genitourinary: Positive for urinary incontinence.   Neurological: Positive for memory problem. Negative for dizziness and syncope.   Hematological: Bruises/bleeds easily.   Psychiatric/Behavioral: Positive for decreased concentration, sleep disturbance and depressed mood. Negative for self-injury and suicidal ideas. The patient is not nervous/anxious.      /62   Pulse 91   Temp 98.3 °F (36.8 °C) (Oral)   Ht 149.9 cm (59\")   Wt 51.3 kg (113 lb)   SpO2 96%   BMI 22.82 kg/m²     Objective   Physical Exam   Constitutional: She appears well-developed and well-nourished.   Neck: Normal range of motion. Neck supple. No thyromegaly present.   Cardiovascular: Normal rate, normal heart sounds and normal pulses. An irregularly irregular rhythm present.   Pulmonary/Chest: Effort normal and breath sounds normal.   Musculoskeletal: She exhibits no edema.   Lymphadenopathy:     She has no cervical adenopathy.   Skin: Skin is warm and dry.   Psychiatric: Thought " content normal. Her affect is inappropriate. She is agitated. Cognition and memory are impaired. She expresses impulsivity. She exhibits a depressed mood.   Nursing note and vitals reviewed.    Assessment/Plan   Problems Addressed this Visit        Digestive    B12 deficiency anemia - Primary    Relevant Orders    Ferritin    Iron Profile    CBC & Differential       Other    Complicated grief    Cognitive and behavioral changes      Other Visit Diagnoses     Frequent falls            B12 anemia--has monthly replacement, wants to check iron stores as well.  Complicated grief--continue AD--mirtazapine.  Cognitive and behavioral changes--continue aricept  Frequent falls--Strongly encouraged walker use as multiple falls and fractures.

## 2019-08-28 NOTE — PROGRESS NOTES
Subjective   Chiquita Anderson is a 84 y.o. female who presents for a follow up on b12 deficiency, grief.     History of Present Illness   Trimmed toenails short at podiatrist, told to soak with epsom salts to L great toenail. Pt is resistant to soaking nail, tender.    Memory issues are worse with short term issues than with long term issues, still having difficulty with sleep. Side effects from aricept vs dementia effect. Halie, daughter reports mom still deeply missing spouse, sleeping restlessly on couch, walking away from walker, with unsteady gait, but reporting a willingness to continue with physical therapy to strengthen gait. She reports a decrease in cognitive abilities since mom has broken 2nd hip.   Planning to continue prolia for osteoporosis  having pain in R thumb x 3 days to PIP, hurting real bad.   Skin tear to R post calf about 2 weeks ago, halie has been applying polysporin and bandaid, nonstick dressing to address    The following portions of the patient's history were reviewed and updated as appropriate: allergies, current medications, past family history, past medical history, past social history, past surgical history and problem list.    Review of Systems   Constitutional: Positive for activity change (walking away from walker). Negative for chills, fever, unexpected weight gain and unexpected weight loss.   HENT: Negative.    Eyes: Negative for visual disturbance.   Respiratory: Negative for shortness of breath.    Cardiovascular: Negative for chest pain, palpitations and leg swelling.   Gastrointestinal: Negative.    Genitourinary: Positive for urgency and urinary incontinence. Decreased urine volume: wears depends at night due to urgency.   Musculoskeletal: Positive for arthralgias, gait problem and joint swelling.   Skin: Positive for skin lesions.   Allergic/Immunologic: Negative.    Neurological: Positive for memory problem and confusion. Negative for dizziness and light-headedness.  "  Psychiatric/Behavioral: Positive for agitation, behavioral problems, sleep disturbance, depressed mood and stress. Negative for hallucinations. The patient is nervous/anxious.      /74   Pulse 76   Temp 97.8 °F (36.6 °C) (Oral)   Ht 149.9 cm (59\")   Wt 53.5 kg (118 lb)   SpO2 97%   BMI 23.83 kg/m²     Objective   Physical Exam   Constitutional: She is oriented to person, place, and time. She appears well-developed and well-nourished.   Cardiovascular: Normal rate and normal heart sounds. An irregularly irregular rhythm present.   Pulmonary/Chest: Effort normal and breath sounds normal.   Neurological: She is alert and oriented to person, place, and time.   Skin: Capillary refill takes less than 2 seconds. Lesion (skin avulsion R calf 1 cm with no sign of infection, no drainage., L great toenail medial fold slight red, nontender.) noted.   Psychiatric: Her behavior is normal. Thought content normal. Her mood appears anxious. Her affect is inappropriate. Her speech is tangential. She is not actively hallucinating. Cognition and memory are impaired. She expresses impulsivity. She exhibits a depressed mood. She expresses no homicidal and no suicidal ideation. She expresses no suicidal plans and no homicidal plans. She exhibits abnormal recent memory. She is inattentive.     Assessment/Plan   Problems Addressed this Visit        Digestive    B12 deficiency - Primary    Relevant Orders    CBC & Differential    Vitamin B12       Other    Gait abnormality    Relevant Orders    Ambulatory Referral to Physical Therapy Evaluate and treat (Completed)    Complicated grief    Cognitive and behavioral changes    Relevant Medications    galantamine (RAZADYNE) 4 MG tablet      Other Visit Diagnoses     Degenerative arthritis of thumb, right        Ingrown left big toenail        Avulsion of skin of lower leg, sequela            B12 deficiency--recheck levels and CBC as chronic anemia  Gait abnormality--continue PT for " strengthening  Complicated grief--continue remeron and buspar  Cognitive changes--stop aricept and trial galantamine to see if less sleep disturbance, follow up 1 month  Degenerative arthritis of thumb--trial of topicals to address as on warfarin, NSAIDS contraindicated  Ingrown toenail--soak and gently avulse medial fold  Skin avulsion--continue daily nonstick bandage changes and topical polysporin

## 2019-08-30 NOTE — TELEPHONE ENCOUNTER
82 yo female with vascular dementoia nd history of encephalopathy secondary to HTN presented with acute onset AMS and dysarthria. Neuroimaging unremarkable for vascular events. EEG withshows generalized slowing c/w encephalopathy with no epileptiform discharge.  Over this admission patient with no electrolyte/metabolic abnormality or active infection. Although patient has been having high BP in the first few days at hospital ranging between 170-200 and also received increased dose of baclofen in this admission which contributed to more somnolence.  Patient's presentation is c/w encephalopathy which could be 2/2 high BP and worsened with high dose baclofen. Slight hyponatremia also could be contributing ti the AMS picture. Seizure based on history, and presentation is less likely. Would not start AED.  Mental status improving    Recommendations:  - avoid benzoes  - correct the metabolic derangements  - sleep/ wake cycle  - seizure precaution, hold AED for now   - delirium precaution  - frequent orientation   - Continue secondary CVA prevention: ASA+Plavix, atorvastatin 80mg daily, carvedilol/amlodipine     Patient is currently at [facility] for PT after a fractured hip last month. She is being prescribed anxiety and depression medication. Her dementia seems to be worsening. She is unaware of the date, day of the week and does not recognize her youngest son. Daughter is trying to get POA. She stopped by the office to request a letter from PCP stating pt has been dx with dementia and is not competent enough to make her own decisions. Pt was originally admitted to [facility] and had an eval by psych who also confirmed the dementia. Daughter does not think she will be able to get a letter from this dr but will try if need be.  requested letter from PCP.

## 2019-09-27 NOTE — PROGRESS NOTES
"Subjective   Chiquita Anderson is a 84 y.o. female who presents for a follow up on cognitive changes. Has been taking Razadyne for about 1 month.     History of Present Illness   Waking up at night to go to the bathroom. Can remember things from 2 days ago, but not immediate memories. Arguing with daughter. Having issues sorting that always discreet pads are different sizes. Having more issues managing briefs at night, can't manage by self at night anymore. Having more difficulty holding conversations.  Saw Nika Telles at heart failure clinic yesterday, no changes  Skin laceration healing well. To RLL, new irritation to L lower leg from oxyclean on compression socks with irritation and bleeding.  The following portions of the patient's history were reviewed and updated as appropriate: allergies, current medications, past family history, past medical history, past social history, past surgical history and problem list.    Review of Systems   Constitutional: Negative for activity change.   Cardiovascular: Positive for leg swelling. Negative for chest pain.   Genitourinary: Positive for urgency and urinary incontinence.   Musculoskeletal: Positive for arthralgias and gait problem.   Skin: Positive for dry skin (to hands ) and skin lesions (to L lateral leg with dryness and irritation).   Neurological: Positive for memory problem and confusion.   Psychiatric/Behavioral: Positive for behavioral problems, decreased concentration (irritability), sleep disturbance, depressed mood and stress. Negative for agitation, hallucinations and suicidal ideas. The patient is nervous/anxious.      /78   Pulse 90   Temp 98.1 °F (36.7 °C) (Oral)   Ht 149.9 cm (59\")   Wt 55.8 kg (123 lb)   SpO2 96%   BMI 24.84 kg/m²     Objective   Physical Exam   Constitutional: She appears well-developed. She appears cachectic. She is uncooperative.  Non-toxic appearance.   Cardiovascular: Normal heart sounds and normal pulses. An irregularly " irregular rhythm present.   Musculoskeletal: She exhibits edema ( trace unique LE).        Right hand: She exhibits bony tenderness, deformity and swelling.        Left hand: She exhibits bony tenderness, deformity and swelling.   Neurological: She is alert.   Skin: Skin is warm. Capillary refill takes less than 2 seconds. Abrasion, ecchymosis and lesion (L lateral calf with excoriation and redness with scant bleeding ) noted. No laceration, no petechiae and no rash noted. No cyanosis. Nails show no clubbing.   Psychiatric: Her mood appears anxious. Thought content is not paranoid and not delusional. Cognition and memory are impaired. She expresses impulsivity and inappropriate judgment. She exhibits a depressed mood. She expresses no homicidal and no suicidal ideation. She expresses no suicidal plans and no homicidal plans. She exhibits abnormal recent memory and abnormal remote memory.   Nursing note and vitals reviewed.    Assessment/Plan   Problems Addressed this Visit        Cardiovascular and Mediastinum    Permanent atrial fibrillation (CMS/HCC)    CHF (congestive heart failure) (CMS/[facility])       Musculoskeletal and Integument    Laceration of skin of right lower leg       Other    Cognitive and behavioral changes - Primary    Relevant Medications    galantamine (RAZADYNE) 8 MG tablet      Other Visit Diagnoses     Immunization due        Relevant Medications    pneumococcal conj. 13-valent (PREVNAR-13) vaccine 0.5 mL (Completed)    Other Relevant Orders    Fluad Tri 65yr+ (Completed)    Abrasion of left lower leg, initial encounter            Afib and CHF--stable euvolemic today, anticoagulated, managed by cardiology.  Laceration of RLL--healing--continue present care  Cognitive changes--becoming more difficult to manage--living independently with close supervision from daughter Maite. Will increase galantamine to therapeutic dose, evaluate if cognition improves 1 month. Discussed expectations of slowed decline,  not necessarily improvement with daughter. Discussed side effects to watch for. Consider checking CMP 1 month with reevaluation. Discussed increase dose with patient and she reluctantly agrees. Has not lost any weight on low dose. Denies any dizziness or falls.  Update immunizations  Abrasion--apply hydrocortisone OTC to sooth redness, nonstick dressing daily under compression socks until healed

## 2019-10-25 NOTE — PROGRESS NOTES
The ABCs of the Annual Wellness Visit  Subsequent Medicare Wellness Visit    Chief Complaint   Patient presents with   • Follow-up       Subjective   History of Present Illness:  Chiquita Anderson is a 84 y.o. female who presents for a Subsequent Medicare Wellness Visit.    HEALTH RISK ASSESSMENT    Recent Hospitalizations:  No hospitalization(s) within the last year.    Current Medical Providers:  Patient Care Team:  Bere Kennedy APRN as PCP - General (Family Medicine)  Bala Wei MD as Consulting Physician (Cardiology)  Reinaldo Dee MD as Consulting Physician (Pulmonary Disease)  En Smith MD as Consulting Physician (Ophthalmology)    Smoking Status:  Social History     Tobacco Use   Smoking Status Never Smoker   Smokeless Tobacco Never Used       Alcohol Consumption:  Social History     Substance and Sexual Activity   Alcohol Use No       Depression Screen:   PHQ-2/PHQ-9 Depression Screening 10/25/2019   Little interest or pleasure in doing things 2   Feeling down, depressed, or hopeless 1   Trouble falling or staying asleep, or sleeping too much 3   Feeling tired or having little energy 2   Poor appetite or overeating 2   Feeling bad about yourself - or that you are a failure or have let yourself or your family down 0   Trouble concentrating on things, such as reading the newspaper or watching television 1   Moving or speaking so slowly that other people could have noticed. Or the opposite - being so fidgety or restless that you have been moving around a lot more than usual 0   Thoughts that you would be better off dead, or of hurting yourself in some way 0   Total Score 11   If you checked off any problems, how difficult have these problems made it for you to do your work, take care of things at home, or get along with other people? Somewhat difficult       Fall Risk Screen:  STEADI Fall Risk Assessment has not been completed.    Health Habits and Functional and Cognitive  Screening:  Functional & Cognitive Status 10/25/2019   Do you have difficulty preparing food and eating? Yes   Do you have difficulty bathing yourself, getting dressed or grooming yourself? No   Do you have difficulty using the toilet? No   Do you have difficulty moving around from place to place? Yes   Do you have trouble with steps or getting out of a bed or a chair? Yes   Current Diet Well Balanced Diet   Dental Exam Up to date   Eye Exam Up to date   Exercise (times per week) 0 times per week   Current Exercise Activities Include No Regular Exercise   Do you need help using the phone?  No   Are you deaf or do you have serious difficulty hearing?  No   Do you need help with transportation? No   Do you need help shopping? No   Do you need help preparing meals?  No   Do you need help with housework?  No   Do you need help with laundry? No   Do you need help taking your medications? No   Do you need help managing money? No   Do you ever drive or ride in a car without wearing a seat belt? No   Have you felt unusual stress, anger or loneliness in the last month? Yes   Who do you live with? Alone   If you need help, do you have trouble finding someone available to you? No   Have you been bothered in the last four weeks by sexual problems? No   Do you have difficulty concentrating, remembering or making decisions? Yes         Does the patient have evidence of cognitive impairment? Yes    Asprin use counseling:Does not need ASA (and currently is not on it)    Age-appropriate Screening Schedule:  Refer to the list below for future screening recommendations based on patient's age, sex and/or medical conditions. Orders for these recommended tests are listed in the plan section. The patient has been provided with a written plan.    Health Maintenance   Topic Date Due   • MAMMOGRAM  10/24/2019   • LIPID PANEL  02/26/2020   • DXA SCAN  06/29/2020   • TDAP/TD VACCINES (3 - Td) 04/29/2029   • INFLUENZA VACCINE  Completed   •  PNEUMOCOCCAL VACCINES (65+ LOW/MEDIUM RISK)  Addressed          The following portions of the patient's history were reviewed and updated as appropriate: allergies, current medications, past family history, past medical history, past social history, past surgical history and problem list.    Outpatient Medications Prior to Visit   Medication Sig Dispense Refill   • busPIRone (BUSPAR) 5 MG tablet TAKE ONE TABLET BY MOUTH THREE TIMES A  tablet 0   • Cholecalciferol (VITAMIN D) 1000 UNITS capsule Take 1 capsule by mouth daily.     • furosemide (LASIX) 40 MG tablet Take 40 mg by mouth Daily. Alternating 40mg and 20mg per cardiology     • galantamine (RAZADYNE) 8 MG tablet Take 1 tablet by mouth 2 (Two) Times a Day. 60 tablet 0   • metoprolol tartrate (LOPRESSOR) 25 MG tablet Take 12.5 mg by mouth Daily With Breakfast.     • mirtazapine (REMERON) 45 MG tablet TAKE ONE TABLET BY MOUTH EVERY NIGHT 30 tablet 1   • pantoprazole (PROTONIX) 40 MG EC tablet Take 1 tablet by mouth 2 (two) times a day.     • potassium chloride (K-DUR) 10 MEQ CR tablet TAKE TWO TABLETS BY MOUTH DAILY 60 tablet 1   • warfarin (COUMADIN) 3 MG tablet      • warfarin (COUMADIN) 5 MG tablet Take by mouth. Take as directed       Facility-Administered Medications Prior to Visit   Medication Dose Route Frequency Provider Last Rate Last Dose   • cyanocobalamin injection 1,000 mcg  1,000 mcg Intramuscular Q30 Days Bere Kennedy APRN   1,000 mcg at 09/27/19 1552       Patient Active Problem List   Diagnosis   • Abnormal computerized tomography of brain   • Atopic rhinitis   • B12 deficiency anemia   • Cervical radiculopathy   • Familial hypercholesterolemia   • Headache   • Unilateral otalgia   • Metatarsalgia   • Tinnitus   • Vertigo   • B12 deficiency   • Vitamin D deficiency   • Cachexia (CMS/HCC)   • Weight loss, abnormal   • Gait abnormality   • Complicated grief   • History of uterine cancer   • Gastroesophageal reflux disease   • Long term  "current use of anticoagulant   • Hyperlipidemia   • Valvular disease   • Protein-calorie malnutrition, moderate (CMS/[facility])   • Permanent atrial fibrillation   • CHF (congestive heart failure) (CMS/[facility])   • Avulsion of skin of forearm, left, initial encounter   • Lumbar degenerative disc disease   • Primary osteoarthritis of left knee   • Cognitive and behavioral changes   • Age-related osteoporosis with current pathological fracture   • Spinal stenosis of lumbar region with neurogenic claudication   • Laceration of skin of right lower leg       Advanced Care Planning:  Patient has an advance directive - a copy has not been provided. Have asked the patient to send this to us to add to record    Review of Systems    Compared to one year ago, the patient feels her physical health is worse.  Compared to one year ago, the patient feels her mental health is worse.    Reviewed chart for potential of high risk medication in the elderly: yes  Reviewed chart for potential of harmful drug interactions in the elderly:yes    Objective         Vitals:    10/25/19 1420   BP: 122/74   Pulse: 96   Temp: 98 °F (36.7 °C)   TempSrc: Oral   SpO2: 97%   Weight: 55.8 kg (123 lb)   Height: 149.9 cm (59\")   PainSc: 0-No pain       Body mass index is 24.84 kg/m².  Discussed the patient's BMI with her. The BMI is in the acceptable range.    Physical Exam    Lab Results   Component Value Date    GLU 85 09/26/2019        Assessment/Plan   Medicare Risks and Personalized Health Plan  CMS Preventative Services Quick Reference  Dementia/Memory   Depression/Dysphoria  Fall Risk  Osteoprorosis Risk    The above risks/problems have been discussed with the patient.  Pertinent information has been shared with the patient in the After Visit Summary.  Follow up plans and orders are seen below in the Assessment/Plan Section.    There are no diagnoses linked to this encounter.  Follow Up:  No Follow-up on file.     An After Visit Summary and PPPS were given " to the patient.

## 2019-10-25 NOTE — PROGRESS NOTES
Subjective   Chiquita Anderson is a 84 y.o. female who presents for a follow up on cognitive changes.     History of Present Illness   Last month increased galantamine to 8mg BID, staying confused, and having profound short term memory loss. Worries about diapers at night. Still fighting with covers at night. Arguing with daughter. Missing spouse, eating is less during the week, worries about acid reflux, not taking her acid reflux medication at night, though could. Spends Saturdays with daughter. Daughter thinks sleeps a lot during the day during the week. Daughter also reports she is complaining about being a bit out of breath. Does have a touch of emphysema. Sees lung doctor next month. Doesn't want meds that doesn't need. Daughter feeds her lunch, then returns to her house to care for dog. Not able to carry on conversation, as switches subjects frequently.   CHF NP does not want her to exceed 123 lbs.   Walking away from walker multiple times. Has had 2 broken hips. Daughter reports wobbly the other night.  The following portions of the patient's history were reviewed and updated as appropriate: allergies, current medications, past family history, past medical history, past social history, past surgical history and problem list.    Review of Systems   Constitutional: Positive for appetite change and fatigue. Negative for fever, unexpected weight gain and unexpected weight loss.   HENT: Negative for rhinorrhea (dryness, not using nose spray).    Eyes: Negative.    Respiratory: Positive for cough and shortness of breath.    Cardiovascular: Negative for leg swelling.   Gastrointestinal: Positive for nausea, vomiting, GERD and indigestion. Negative for abdominal distention, abdominal pain and blood in stool.   Endocrine: Positive for cold intolerance.   Genitourinary: Negative.    Musculoskeletal: Positive for gait problem (uses walker at times).   Skin: Negative.    Allergic/Immunologic: Positive for environmental  "allergies.   Neurological: Positive for weakness (generalized.) and memory problem. Negative for seizures, speech difficulty and headache.   Hematological: Negative.    Psychiatric/Behavioral: Positive for behavioral problems, decreased concentration, sleep disturbance and depressed mood. Negative for agitation, dysphoric mood, hallucinations, self-injury, suicidal ideas, negative for hyperactivity and stress. The patient is nervous/anxious.      /74   Pulse 96   Temp 98 °F (36.7 °C) (Oral)   Ht 149.9 cm (59\")   Wt 55.8 kg (123 lb)   SpO2 97%   BMI 24.84 kg/m²     Objective   Physical Exam   Constitutional: She appears well-developed and well-nourished. No distress.   Cardiovascular: Normal rate. An irregularly irregular rhythm present.   Pulmonary/Chest: Effort normal and breath sounds normal.   Abdominal: Soft. She exhibits no distension. There is no tenderness.   Musculoskeletal: She exhibits no edema.   Skin: Skin is warm and dry.   Psychiatric: Her mood appears anxious. Her speech is tangential. She is withdrawn. Cognition and memory are impaired. She expresses inappropriate judgment. She exhibits a depressed mood. She exhibits abnormal recent memory. She is attentive.   Nursing note and vitals reviewed.    Assessment/Plan   Problems Addressed this Visit        Digestive    Gastroesophageal reflux disease       Other    Gait abnormality - Primary    Complicated grief    Cognitive and behavioral changes        GERD--take protonix BID as significant nausea, concern regarding fear of nausea.  Gait abnormality--recommend continuous walker use, strongly encouraged 100% use  Complicated grief--continue mirtazapine  Cognitive changes--continue galantamine, recommend get engaged at [facility] elder care services, daughter and pt decline. Discussed personal safety and possible need for placement in nursing facility. Needing very close supervision to manage. Still with unsafe behaviors. Recommend " placement  FU 3 months

## 2019-10-25 NOTE — PATIENT INSTRUCTIONS
Medicare Wellness  Personal Prevention Plan of Service     Date of Office Visit:  10/25/2019  Encounter Provider:  KIRSTEN Khan  Place of Service:  [facility]  Patient Name: Chiquita Anderson  :  1935    As part of the Medicare Wellness portion of your visit today, we are providing you with this personalized preventive plan of services (PPPS). This plan is based upon recommendations of the [place] Preventive Services Task Force (USPSTF) and the Advisory Committee on Immunization Practices (ACIP).    This lists the preventive care services that should be considered, and provides dates of when you are due. Items listed as completed are up-to-date and do not require any further intervention.    Health Maintenance   Topic Date Due   • MAMMOGRAM  10/24/2019   • LIPID PANEL  2020   • DXA SCAN  2020   • MEDICARE ANNUAL WELLNESS  10/25/2020   • TDAP/TD VACCINES (3 - Td) 2029   • INFLUENZA VACCINE  Completed   • PNEUMOCOCCAL VACCINES (65+ LOW/MEDIUM RISK)  Addressed       Orders Placed This Encounter   Procedures   • Comprehensive Metabolic Panel       Return in about 3 months (around 2020) for Recheck.

## 2019-11-04 NOTE — PROGRESS NOTES
"Subjective   Chiquita Anderson is a 84 y.o. female who presents c/o swelling and redness to both lower legs that is not improving. Also worried about lack of sleep and concentration.     History of Present Illness   Seen 10/25 and recommended for nursing home placement due to cognitive decline and unsafe behaviors in the home. Has been tried on galantamine, aricept. Has declined referrals to psychiatry, neurology.   Daughter thinks she is talking out of her head more at night, more confused the last 3-4 nights. Wonders if associated with the sores on her legs.  The following portions of the patient's history were reviewed and updated as appropriate: allergies, current medications, past family history, past medical history, past social history, past surgical history and problem list.    Review of Systems   Constitutional: Negative for fever, unexpected weight gain and unexpected weight loss.   HENT: Negative.    Respiratory: Negative.    Cardiovascular: Positive for palpitations and leg swelling. Negative for chest pain.   Gastrointestinal: Negative.    Endocrine: Negative.    Genitourinary: Positive for frequency and urinary incontinence.   Skin: Positive for color change, rash (perineal) and skin lesions.   Allergic/Immunologic: Negative.    Neurological: Positive for weakness, memory problem and confusion.   Psychiatric/Behavioral: Positive for behavioral problems, decreased concentration, sleep disturbance, depressed mood and stress. Negative for self-injury and suicidal ideas. The patient is nervous/anxious.      /74   Pulse 73   Temp 98.3 °F (36.8 °C) (Oral)   Ht 149.9 cm (59\")   Wt 55.3 kg (122 lb)   SpO2 99%   BMI 24.64 kg/m²     Objective   Physical Exam   Constitutional: She appears well-developed and well-nourished. She appears distressed.   Cardiovascular: Normal rate.   Pulmonary/Chest: Effort normal.   Musculoskeletal: She exhibits edema (trace pedal bilateral).   Neurological: She is alert. "   Skin: Skin is warm and dry. Abrasion, ecchymosis and rash noted. Rash is macular (red with satellites to perineum). No cyanosis. Nails show no clubbing.   R posterior lateral calf with red hot weeping wound with induration, serous exudate cultured 6 inches x 8 inches  L lateral calf with 4 x 6 inch warm red indurated wound superficial with scant serous exudate   Psychiatric: Thought content normal. Her mood appears anxious. Her affect is inappropriate. Her speech is rapid and/or pressured and tangential. She is agitated. Thought content is not paranoid and not delusional. Cognition and memory are impaired. She expresses inappropriate judgment. She exhibits a depressed mood. She expresses no homicidal and no suicidal ideation. She expresses no suicidal plans and no homicidal plans. She exhibits abnormal recent memory.   Nursing note and vitals reviewed.    Assessment/Plan   Problems Addressed this Visit        Other    Cognitive and behavioral changes    Relevant Orders    CBC & Differential    C-reactive Protein    Comprehensive Metabolic Panel    Sedimentation Rate    Lactic Acid, Plasma      Other Visit Diagnoses     Yeast dermatitis    -  Primary    Relevant Medications    nystatin (MYCOSTATIN) 432515 UNIT/GM cream    mupirocin (BACTROBAN) 2 % cream    Cellulitis of lower extremity, unspecified laterality        Relevant Medications    mupirocin (BACTROBAN) 2 % cream    doxycycline (VIBRAMYCIN) 100 MG capsule    Other Relevant Orders    CBC & Differential    C-reactive Protein    Comprehensive Metabolic Panel    Sedimentation Rate    Lactic Acid, Plasma    Anaerobic & Aerobic Culture ([facility] Only) - Swab, Leg, Right        Cognitive and behavioral changes--clearly dementia--worsening--daughter reports acute decompensation.--check labs--CT with global cerebral atrophy chronic small vessel ischemic changes in 2017, consider adding namenda, but doubt incremental benefit as becoming more severe, again recommend  placement due to unsafe behaviors, especially at night, walking around in the dark with flashlight, etc. Patient and daughter decline.  Yeast dermatitis--discussed routine care--keep dry--apply nystatin  Cellulitis--were small skin tears 2 weeks ago--change to mupirocin, R was cultured. Discussed risk of doxy with warfarin, to notify coagulation nurse of Rx in the morning, for new INR monitoring schedule. Change dressings daily. Wash with soap and water.  FU 1 week.

## 2019-11-06 NOTE — PROGRESS NOTES
Please call the Miate regarding her result. White blood cell count is not elevated. Only mild inflammatory markers. Medications as discussed. Keep 1 week follow up on legs. Potassium mildly elevated. May be specimen issue. Continue potassium and recheck BMP 1 week.

## 2019-11-11 NOTE — PROGRESS NOTES
"Bita Anderson is a 84 y.o. female who presents for a follow up on cellulitis of lower extremities.     History of Present Illness   Taking doxycycline, and applying bactroban. Cultures grew out staph and strep   Rash is not improving to perineum and itches, has spread to lateral thighs  Has been taking extra 1/2 water pill.  Certain foods are making her sick when eats out, not taking extra acid reflux pill at night.  Thinking about using depends overnight pullups for overnight.   The following portions of the patient's history were reviewed and updated as appropriate: allergies, current medications, past family history, past medical history, past social history, past surgical history and problem list.    Review of Systems   Constitutional: Negative for chills and fever. Activity change: less active, not doing exercises.   Cardiovascular: Positive for leg swelling.   Gastrointestinal: Positive for indigestion.   Genitourinary: Positive for urinary incontinence.   Skin: Positive for color change, rash and skin lesions.   Neurological: Positive for memory problem.   Hematological: Negative for adenopathy.   Psychiatric/Behavioral: Positive for behavioral problems and decreased concentration.     /76   Pulse 84   Temp 98.4 °F (36.9 °C) (Oral)   Ht 149.9 cm (59\")   Wt 56.7 kg (125 lb)   SpO2 98%   BMI 25.25 kg/m²     Objective   Physical Exam   Constitutional: She appears well-developed and well-nourished. No distress.   Cardiovascular: Normal rate.   Pulmonary/Chest: Effort normal.   Musculoskeletal: She exhibits no edema.   Skin: Skin is warm and dry.   Red no warmth, R and L calf dry with 0 exudate R lateral and posterior 8 x 6 inches, L  Is similar but anterolateral, 9 x 6 inches. Perineal micro macular rash with satellites is drying up, but evidence of excoriation. Remains very red at angle of R groin   Psychiatric: Her mood appears anxious. Her affect is inappropriate. Her speech is rapid " and/or pressured and tangential. Thought content is paranoid. Thought content is not delusional. Cognition and memory are impaired. She expresses inappropriate judgment. She exhibits a depressed mood. She exhibits abnormal recent memory. She is inattentive.   Nursing note and vitals reviewed.    Assessment/Plan   Problems Addressed this Visit        Cardiovascular and Mediastinum    CHF (congestive heart failure) (CMS/[facility])       Other    Cognitive and behavioral changes      Other Visit Diagnoses     Hyperkalemia    -  Primary    Relevant Orders    Basic Metabolic Panel    Cellulitis of lower extremity, unspecified laterality        Relevant Medications    mupirocin (BACTROBAN) 2 % cream    Candidal diaper dermatitis        Relevant Medications    triamcinolone (KENALOG) 0.025 % ointment    mupirocin (BACTROBAN) 2 % cream    Yeast dermatitis        Relevant Medications    triamcinolone (KENALOG) 0.025 % ointment    mupirocin (BACTROBAN) 2 % cream      CHF--continue to titrate lasix per instructions of heart failure clinic  Cognitive changes--continue to recommend placement  Hyperkalemia--would recheck BMP  Cellulitis--improved, continue mupirocin to legs, finish doxycycline--Elevate legs during the day. FU 1 week.--if not continuing to improve, would refer to wound care  Diaper dermatitis--mix triamcinolone with nystatin to angry and dry areas, to reduce itching and calm rash.   FU 1 week

## 2019-12-17 NOTE — TELEPHONE ENCOUNTER
Maite notified, she will inform MD at rehab. Will call when pt is released for a new script to be sent to pharmacy.

## 2019-12-20 NOTE — TELEPHONE ENCOUNTER
Maite says pt is being discharged from rehab on Monday. She will start outpatient PT next week. She wants scripts for the following meds sent to pharmacy:  galantamine to 12 mg BID #60 0rf  buspar 10 mg TID  remeron 30mg    The buspar and galantamine were increased by you via phone and the remeron was changed in rehab. She has a follow up on 1/3 for follow up in the office.

## 2020-01-01 ENCOUNTER — TELEPHONE (OUTPATIENT)
Dept: FAMILY MEDICINE CLINIC | Facility: CLINIC | Age: 85
End: 2020-01-01

## 2020-01-01 ENCOUNTER — LAB REQUISITION (OUTPATIENT)
Dept: LAB | Facility: HOSPITAL | Age: 85
End: 2020-01-01

## 2020-01-01 ENCOUNTER — OFFICE VISIT (OUTPATIENT)
Dept: FAMILY MEDICINE CLINIC | Facility: CLINIC | Age: 85
End: 2020-01-01

## 2020-01-01 VITALS
DIASTOLIC BLOOD PRESSURE: 70 MMHG | WEIGHT: 111 LBS | TEMPERATURE: 98.1 F | BODY MASS INDEX: 22.38 KG/M2 | OXYGEN SATURATION: 94 % | SYSTOLIC BLOOD PRESSURE: 122 MMHG | HEIGHT: 59 IN | HEART RATE: 84 BPM

## 2020-01-01 VITALS
SYSTOLIC BLOOD PRESSURE: 120 MMHG | DIASTOLIC BLOOD PRESSURE: 68 MMHG | HEIGHT: 59 IN | HEART RATE: 77 BPM | BODY MASS INDEX: 25.6 KG/M2 | TEMPERATURE: 97.5 F | WEIGHT: 127 LBS | OXYGEN SATURATION: 97 %

## 2020-01-01 DIAGNOSIS — F02.818 LATE ONSET ALZHEIMER'S DISEASE WITH BEHAVIORAL DISTURBANCE (HCC): ICD-10-CM

## 2020-01-01 DIAGNOSIS — F02.818 LATE ONSET ALZHEIMER'S DISEASE WITH BEHAVIORAL DISTURBANCE (HCC): Primary | ICD-10-CM

## 2020-01-01 DIAGNOSIS — Z00.00 ROUTINE GENERAL MEDICAL EXAMINATION AT A HEALTH CARE FACILITY: ICD-10-CM

## 2020-01-01 DIAGNOSIS — I48.21 PERMANENT ATRIAL FIBRILLATION (HCC): ICD-10-CM

## 2020-01-01 DIAGNOSIS — L30.9 DERMATITIS OF LOWER EXTREMITY: ICD-10-CM

## 2020-01-01 DIAGNOSIS — G30.1 LATE ONSET ALZHEIMER'S DISEASE WITH BEHAVIORAL DISTURBANCE (HCC): Primary | ICD-10-CM

## 2020-01-01 DIAGNOSIS — R46.89 COGNITIVE AND BEHAVIORAL CHANGES: ICD-10-CM

## 2020-01-01 DIAGNOSIS — R60.0 BILATERAL LOWER EXTREMITY EDEMA: ICD-10-CM

## 2020-01-01 DIAGNOSIS — S81.811D LACERATION OF SKIN OF RIGHT LOWER LEG, SUBSEQUENT ENCOUNTER: ICD-10-CM

## 2020-01-01 DIAGNOSIS — R41.89 COGNITIVE AND BEHAVIORAL CHANGES: ICD-10-CM

## 2020-01-01 DIAGNOSIS — R14.2 BELCHING: Primary | ICD-10-CM

## 2020-01-01 DIAGNOSIS — F43.21 COMPLICATED GRIEF: ICD-10-CM

## 2020-01-01 DIAGNOSIS — R63.4 WEIGHT LOSS, ABNORMAL: ICD-10-CM

## 2020-01-01 DIAGNOSIS — I50.32 CHRONIC DIASTOLIC CONGESTIVE HEART FAILURE (HCC): ICD-10-CM

## 2020-01-01 DIAGNOSIS — G30.1 LATE ONSET ALZHEIMER'S DISEASE WITH BEHAVIORAL DISTURBANCE (HCC): ICD-10-CM

## 2020-01-01 DIAGNOSIS — E44.0 PROTEIN-CALORIE MALNUTRITION, MODERATE (HCC): ICD-10-CM

## 2020-01-01 LAB
ANION GAP SERPL CALCULATED.3IONS-SCNC: 11.7 MMOL/L (ref 5–15)
BASOPHILS # BLD AUTO: 0.01 10*3/MM3 (ref 0–0.2)
BASOPHILS NFR BLD AUTO: 0.1 % (ref 0–1.5)
BUN BLD-MCNC: 37 MG/DL (ref 8–23)
BUN/CREAT SERPL: 52.9 (ref 7–25)
CALCIUM SPEC-SCNC: 8.6 MG/DL (ref 8.6–10.5)
CHLORIDE SERPL-SCNC: 109 MMOL/L (ref 98–107)
CO2 SERPL-SCNC: 22.3 MMOL/L (ref 22–29)
CREAT BLD-MCNC: 0.7 MG/DL (ref 0.57–1)
DEPRECATED RDW RBC AUTO: 46.3 FL (ref 37–54)
EOSINOPHIL # BLD AUTO: 0.16 10*3/MM3 (ref 0–0.4)
EOSINOPHIL NFR BLD AUTO: 1.6 % (ref 0.3–6.2)
ERYTHROCYTE [DISTWIDTH] IN BLOOD BY AUTOMATED COUNT: 15 % (ref 12.3–15.4)
GFR SERPL CREATININE-BSD FRML MDRD: 80 ML/MIN/1.73
GLUCOSE BLD-MCNC: 90 MG/DL (ref 65–99)
HCT VFR BLD AUTO: 34.5 % (ref 34–46.6)
HGB BLD-MCNC: 11.5 G/DL (ref 12–15.9)
IMM GRANULOCYTES # BLD AUTO: 0.06 10*3/MM3 (ref 0–0.05)
IMM GRANULOCYTES NFR BLD AUTO: 0.6 % (ref 0–0.5)
INR PPP: 2.31 (ref 0.9–1.1)
LYMPHOCYTES # BLD AUTO: 1.54 10*3/MM3 (ref 0.7–3.1)
LYMPHOCYTES NFR BLD AUTO: 15.6 % (ref 19.6–45.3)
MCH RBC QN AUTO: 28.4 PG (ref 26.6–33)
MCHC RBC AUTO-ENTMCNC: 33.3 G/DL (ref 31.5–35.7)
MCV RBC AUTO: 85.2 FL (ref 79–97)
MONOCYTES # BLD AUTO: 0.39 10*3/MM3 (ref 0.1–0.9)
MONOCYTES NFR BLD AUTO: 4 % (ref 5–12)
NEUTROPHILS # BLD AUTO: 7.71 10*3/MM3 (ref 1.7–7)
NEUTROPHILS NFR BLD AUTO: 78.1 % (ref 42.7–76)
NRBC BLD AUTO-RTO: 0 /100 WBC (ref 0–0.2)
PLATELET # BLD AUTO: 202 10*3/MM3 (ref 140–450)
PMV BLD AUTO: 10.1 FL (ref 6–12)
POTASSIUM BLD-SCNC: 3.8 MMOL/L (ref 3.5–5.2)
PROTHROMBIN TIME: 25.1 SECONDS (ref 11.7–14.2)
RBC # BLD AUTO: 4.05 10*6/MM3 (ref 3.77–5.28)
SODIUM BLD-SCNC: 143 MMOL/L (ref 136–145)
WBC NRBC COR # BLD: 9.87 10*3/MM3 (ref 3.4–10.8)

## 2020-01-01 PROCEDURE — 96372 THER/PROPH/DIAG INJ SC/IM: CPT | Performed by: NURSE PRACTITIONER

## 2020-01-01 PROCEDURE — 85610 PROTHROMBIN TIME: CPT

## 2020-01-01 PROCEDURE — 99214 OFFICE O/P EST MOD 30 MIN: CPT | Performed by: NURSE PRACTITIONER

## 2020-01-01 PROCEDURE — 99215 OFFICE O/P EST HI 40 MIN: CPT | Performed by: NURSE PRACTITIONER

## 2020-01-01 PROCEDURE — 85025 COMPLETE CBC W/AUTO DIFF WBC: CPT

## 2020-01-01 PROCEDURE — 80048 BASIC METABOLIC PNL TOTAL CA: CPT

## 2020-01-01 RX ORDER — MIRTAZAPINE 30 MG/1
TABLET, FILM COATED ORAL
Qty: 30 TABLET | Refills: 0 | OUTPATIENT
Start: 2020-01-01

## 2020-01-01 RX ORDER — GALANTAMINE HYDROBROMIDE 12 MG/1
TABLET, FILM COATED ORAL
Qty: 60 TABLET | Refills: 0 | Status: SHIPPED | OUTPATIENT
Start: 2020-01-01

## 2020-01-01 RX ORDER — BUSPIRONE HYDROCHLORIDE 5 MG/1
TABLET ORAL
Qty: 270 TABLET | Refills: 0 | OUTPATIENT
Start: 2020-01-01

## 2020-01-01 RX ORDER — BUSPIRONE HYDROCHLORIDE 10 MG/1
TABLET ORAL
Qty: 90 TABLET | Refills: 0 | Status: SHIPPED | OUTPATIENT
Start: 2020-01-01 | End: 2020-01-01

## 2020-01-01 RX ORDER — MIRTAZAPINE 30 MG/1
TABLET, FILM COATED ORAL
Qty: 30 TABLET | Refills: 0 | Status: SHIPPED | OUTPATIENT
Start: 2020-01-01

## 2020-01-01 RX ORDER — BUSPIRONE HYDROCHLORIDE 10 MG/1
TABLET ORAL
Qty: 90 TABLET | Refills: 0 | Status: SHIPPED | OUTPATIENT
Start: 2020-01-01

## 2020-01-01 RX ORDER — GALANTAMINE HYDROBROMIDE 12 MG/1
TABLET, FILM COATED ORAL
Qty: 60 TABLET | Refills: 0 | Status: SHIPPED | OUTPATIENT
Start: 2020-01-01 | End: 2020-01-01

## 2020-01-01 RX ORDER — POTASSIUM CHLORIDE 750 MG/1
TABLET, FILM COATED, EXTENDED RELEASE ORAL
Qty: 60 TABLET | Refills: 0 | Status: SHIPPED | OUTPATIENT
Start: 2020-01-01 | End: 2020-01-01

## 2020-01-01 RX ORDER — POTASSIUM CHLORIDE 750 MG/1
TABLET, EXTENDED RELEASE ORAL
Qty: 60 TABLET | Refills: 0 | Status: SHIPPED | OUTPATIENT
Start: 2020-01-01

## 2020-01-01 RX ADMIN — CYANOCOBALAMIN 1000 MCG: 1000 INJECTION, SOLUTION INTRAMUSCULAR; SUBCUTANEOUS at 15:21

## 2020-01-01 RX ADMIN — CYANOCOBALAMIN 1000 MCG: 1000 INJECTION, SOLUTION INTRAMUSCULAR; SUBCUTANEOUS at 15:34

## 2020-01-03 PROBLEM — F03.90 DEMENTIA (HCC): Status: ACTIVE | Noted: 2019-01-01

## 2020-01-03 NOTE — PROGRESS NOTES
Subjective   Chiquita Anderson is a 84 y.o. female. Patient presents today to follow from a recent hospital visit due to heart failure.     History of Present Illness   Discharged 12/13 to [facility], home on [facility] loulou BMP, PT was Drawn yesterday, was 2.9, has [facility] coming in, but Ms. Anderson is not liking them coming out to the house. Has been very argumentative, confusion is worse. Daughter has been staying with her day and night. Daughter reports pt having anxiety attacks when home health coming out. Pt perceives Home health personnel stay prolonged in the home and wander around, go through her things, though they do not come in the home unless Maite is there. Maite reports they come in the front room only. Saw Nika Telles last time, whom ordered home health, heart failure program.. Getting more confused, not knowing family members. Does not recall being in the hospital, was throwing walker while in hospital, required sitters while in hospital. In need of 24 hour care currently, Maite needs help. Applying triamcinolone to legs after blotchy rash and extreme dryness to legs. This is partially resolving. Nika Telles would also like a consensus of whether she needs wound care again to lower extremities. Maite does feel they are healing. Maite has not been able to get Ms Anderson's finances any more settled, though she is her POA. She reports she is a do not resuscitate, though we do not have documentation.   The following portions of the patient's history were reviewed and updated as appropriate: allergies, current medications, past family history, past medical history, past social history, past surgical history and problem list.    Review of Systems   Constitutional: Positive for activity change and fatigue. Negative for appetite change, fever, unexpected weight gain and unexpected weight loss.   HENT: Negative.    Eyes: Negative.    Respiratory: Negative.  Negative for shortness of breath.    Cardiovascular: Positive for leg  "swelling. Negative for chest pain and palpitations.   Endocrine: Positive for cold intolerance.   Genitourinary: Negative.    Musculoskeletal: Positive for arthralgias and back pain.   Skin: Positive for color change, dry skin and rash. Negative for skin lesions and bruise.   Allergic/Immunologic: Negative.    Neurological: Positive for weakness, memory problem and confusion. Negative for seizures and light-headedness.   Hematological: Bruises/bleeds easily.   Psychiatric/Behavioral: Positive for agitation, behavioral problems, decreased concentration, hallucinations, sleep disturbance, depressed mood and stress. The patient is nervous/anxious.      /68 (BP Location: Left arm, Patient Position: Sitting, Cuff Size: Adult)   Pulse 77   Temp 97.5 °F (36.4 °C) (Oral)   Ht 149.9 cm (59\")   Wt 57.6 kg (127 lb)   SpO2 97%   BMI 25.65 kg/m²     Objective   Physical Exam   Constitutional: Vital signs are normal. She appears cachectic. She is uncooperative. She appears distressed (agitated).   Neck: Trachea normal. No JVD present. No thyroid mass present.   Cardiovascular: Normal rate and intact distal pulses. An irregularly irregular rhythm present.   Pulses:       Dorsalis pedis pulses are 1+ on the right side, and 1+ on the left side.   Pulmonary/Chest: Effort normal and breath sounds normal. She has no wheezes.   Musculoskeletal: She exhibits no edema (no ankle edema at all, very light compression to knees noted, not therapeutic strength) or tenderness.   Skin: Skin is warm. Capillary refill takes less than 2 seconds. No abrasion, no bruising, no laceration, no lesion, no petechiae and no rash noted. There is erythema (trace from ankles bilateral to just below knees, no heat or lesions.).   Dryness to ankles only with scale, no cracking.    Psychiatric: Her mood appears anxious. Her affect is angry, blunt and inappropriate. Her speech is rapid and/or pressured and tangential. She is agitated and aggressive. " She is not actively hallucinating. Thought content is paranoid and delusional. Cognition and memory are impaired. She expresses impulsivity and inappropriate judgment. She is inattentive.     Assessment/Plan   Problems Addressed this Visit        Cardiovascular and Mediastinum    Permanent atrial fibrillation    CHF (congestive heart failure) (CMS/[facility])       Nervous and Auditory    Dementia (CMS/[facility]) - Primary      Other Visit Diagnoses     Dermatitis of lower extremity        Bilateral lower extremity edema          PAF--on anticoagulation--managed by cardiology--INR therapeutic yesterday--home health is drawing weekly  CHF--extended teaching with pt regarding importance of home health CHF program to avoid readmission with 2 admissions in last 35 days. She is inattentive and paranoid in discussion. Strongly encouraged to participate in program as would allow IV diuretics if needed. euvolemic today with 2 lb weight gain only. No edema at all.  Dementia--significant worsening--significant decrease in attentive skill and comprehension abilities. No longer safe alone. Strongly counseled family to pursue 24 hour care. To engage  through home health to evaluate options. Maite is massively overwhelmed. Siblings are unwilling to help and will not provide respite. Pt was combative in hospital. Not combative today, but not redirectable. Abusive verbally to daughter. It is time for placement in nursing home. Maite is angry that I verbalize this, as it will make her evening more difficult. It is time to voice this again. Very much a safety issue. Maite no longer feels Ms Anderson will be able to dial phone if needs help during night if left alone.   Dermatitis--under much better control--doubt role of wound care at this point, continue steroid topical to dry areas. No sign of infection today.   Edema is fully resolved.   FU 1 month if not placed long term--which is my recommendation for safety.  50 min spent in  counseling about dementia and safety in the home.

## 2020-01-06 NOTE — TELEPHONE ENCOUNTER
----- Message from KIRSTEN Martinez sent at 1/5/2020  2:08 PM EST -----  Please contact Mouna GAITAN for  to assess living situation safety, possible nursing home placement

## 2020-02-10 NOTE — PROGRESS NOTES
"Subjective   Chiquita Anderson is a 84 y.o. female who presents for a follow up on alzheimer's disease. Has a cut on right leg.     History of Present Illness   Cut leg trying to move a table, daughter has been applying polysporin, R  leg is all red. Pt argues with daughter. Son took her over the weekend, she did enjoy trip. Still with significant behavioral disturbance. Not eating well. Worried about having bowel movements. Also worried about drinking water as doesn't want to go to urinate. Always discreet, doesn't like. Won't wear depends overnight. Daughter tries to not let her sleep too much during day.  The following portions of the patient's history were reviewed and updated as appropriate: allergies, current medications, past family history, past medical history, past social history, past surgical history and problem list.    Review of Systems   Constitutional: Positive for appetite change and unexpected weight loss. Negative for activity change, chills, fatigue and fever.   HENT: Negative.    Respiratory: Negative for shortness of breath.    Cardiovascular: Negative for chest pain and leg swelling.   Gastrointestinal: Positive for constipation. Negative for abdominal pain, blood in stool, diarrhea, nausea and vomiting.   Musculoskeletal: Positive for arthralgias and gait problem. Negative for myalgias.   Skin: Positive for color change.   Hematological: Negative.    Psychiatric/Behavioral: Positive for agitation, behavioral problems, decreased concentration, dysphoric mood, sleep disturbance and stress. Negative for self-injury, suicidal ideas and depressed mood. The patient is nervous/anxious.      /70   Pulse 84   Temp 98.1 °F (36.7 °C) (Oral)   Ht 149.9 cm (59\")   Wt 50.3 kg (111 lb)   SpO2 94%   BMI 22.42 kg/m²     Objective   Physical Exam   Constitutional: Vital signs are normal. She appears cachectic. She is uncooperative.  Non-toxic appearance.   Neck: Normal range of motion. Neck supple. No " tracheal deviation present. No thyromegaly present.   Cardiovascular: Normal rate and S2 normal. An irregularly irregular rhythm present.  No extrasystoles are present. PMI is not displaced.   Murmur heard.   Systolic murmur is present with a grade of 2/6.  Pulmonary/Chest: Effort normal and breath sounds normal. No respiratory distress. She has no wheezes.   Musculoskeletal: She exhibits no edema.   Lymphadenopathy:     She has no cervical adenopathy.   Neurological: She is alert.   Skin: Capillary refill takes less than 2 seconds. She is not diaphoretic.   Laceration superficial to R shin x 1 inch, redness and heat extending down leg to ankle.    Psychiatric: Her mood appears anxious. Her affect is angry and inappropriate. Her speech is tangential. She is agitated. She is not combative. Thought content is paranoid. Cognition and memory are impaired. She expresses inappropriate judgment. She expresses no homicidal and no suicidal ideation. She expresses no suicidal plans and no homicidal plans. She exhibits abnormal recent memory and abnormal remote memory.     Assessment/Plan   Problems Addressed this Visit        Cardiovascular and Mediastinum    CHF (congestive heart failure) (CMS/HCC)       Digestive    Protein-calorie malnutrition, moderate (CMS/HCC)       Nervous and Auditory    Dementia (CMS/HCC)       Musculoskeletal and Integument    Laceration of skin of right lower leg       Other    Weight loss, abnormal      Other Visit Diagnoses     Belching    -  Primary        CHF--euvolemic today with no edema. Abnormal weight loss. To continue to follow with heart failure clinic.   Malnutrition--refuses supplements like ensure. Recommend calorie dense foods. Increased efforts at gaining weight to regulate bowels.  Dementia--severe with behavioral disturbance increase. Increased safety issues. Not using walker regularly. Having difficulty speaking in full sentences now. Have discussed placement in nursing home due  to safety issues. We discuss safety issues again today. She actually did not recognize her walker this week. Using less often. Has fallen previously and broken hips x 2. All discussed with pt and daughter. Daughter is unwilling to move forward in finding placement. Is leaving her alone at night, this a safety concern as well.   Skin laceration--apply mupirocin with triamcinolone BID, keep open wound covered. Call if not improving 1 week  Weight loss--will check labs again next month  Belching--encouraged regular meals and snacks--PPI use BID  45 minutes face to face discussing behavioral issues and how to manage. Pt safety in the home. No longer safe to be alone.   FU 1 month

## 2020-02-20 NOTE — TELEPHONE ENCOUNTER
FYI patient had a fall and was treated at [facility] for a clavicle fracture. She is currently under the care of [facility].

## 2020-03-04 NOTE — TELEPHONE ENCOUNTER
FYI - Rehab took pt off mirtazapine 30mg. Does pt need to stay on the mirtazapine or not?  The nurse at the rehab facility needs the okay from [facility] and then they will have to talk to the Dr at there to get order. Please call daughter and let her know.

## 2020-03-05 NOTE — TELEPHONE ENCOUNTER
Maite is going to bring by a list of meds that Chiquita is currently taking at [facility]. [facility] told her they did not see any replacement for the mirtazapine on the list. She is going to be seeing the house  at rehab for now and has cancelled appts here. Rehab mentioned starting mirtazapine again tonight.

## 2020-04-12 ENCOUNTER — LAB REQUISITION (OUTPATIENT)
Dept: LAB | Facility: HOSPITAL | Age: 85
End: 2020-04-12

## 2020-04-12 DIAGNOSIS — Z00.00 ROUTINE GENERAL MEDICAL EXAMINATION AT A HEALTH CARE FACILITY: ICD-10-CM

## 2020-04-12 LAB — NT-PROBNP SERPL-MCNC: 3426 PG/ML (ref 5–1800)

## 2020-04-12 PROCEDURE — 83880 ASSAY OF NATRIURETIC PEPTIDE: CPT

## 2020-04-25 ENCOUNTER — LAB REQUISITION (OUTPATIENT)
Dept: LAB | Facility: HOSPITAL | Age: 85
End: 2020-04-25

## 2020-04-25 DIAGNOSIS — Z00.00 ROUTINE GENERAL MEDICAL EXAMINATION AT A HEALTH CARE FACILITY: ICD-10-CM

## 2020-04-25 LAB
INR PPP: 3.52 (ref 0.9–1.1)
PROTHROMBIN TIME: 35 SECONDS (ref 11.7–14.2)

## 2020-04-25 PROCEDURE — 85610 PROTHROMBIN TIME: CPT

## 2020-05-06 NOTE — TELEPHONE ENCOUNTER
Patient is still in a nursing home. The psychologist wants to take her off of galantamine. They say it becomes ineffective after 6-8 mos. Maite wants to know if this is true? She told them she would prefer patient stay on this medication and they are pressuring her to let them stop giving it. She wants your advice.

## 2020-05-06 NOTE — TELEPHONE ENCOUNTER
Would try taking her off it and see if weight improves. You could always restart if needed. Anorexia is common side effect

## 2020-05-06 NOTE — TELEPHONE ENCOUNTER
She is down to 99 lbs but halie thinks this is due to not eating the nursing home food. Halie was bringing her food in but she has been unable to do this for 2 months. She thinks her memory will get worse without the medication.

## 2020-05-06 NOTE — TELEPHONE ENCOUNTER
There aren't large long term studies, so length of efficacy is truly unknown. If she is having side effects like losing weight I would stop it. Also if her thinking skills have declined a lot further would stop.

## 2020-05-20 ENCOUNTER — LAB REQUISITION (OUTPATIENT)
Dept: LAB | Facility: HOSPITAL | Age: 85
End: 2020-05-20

## 2020-05-20 DIAGNOSIS — Z00.00 ROUTINE GENERAL MEDICAL EXAMINATION AT A HEALTH CARE FACILITY: ICD-10-CM

## 2020-05-20 LAB
ANION GAP SERPL CALCULATED.3IONS-SCNC: 13.7 MMOL/L (ref 5–15)
BASOPHILS # BLD AUTO: 0.01 10*3/MM3 (ref 0–0.2)
BASOPHILS NFR BLD AUTO: 0.2 % (ref 0–1.5)
BUN BLD-MCNC: 25 MG/DL (ref 8–23)
BUN/CREAT SERPL: 26 (ref 7–25)
CALCIUM SPEC-SCNC: 8.3 MG/DL (ref 8.6–10.5)
CHLORIDE SERPL-SCNC: 103 MMOL/L (ref 98–107)
CO2 SERPL-SCNC: 21.3 MMOL/L (ref 22–29)
CREAT BLD-MCNC: 0.96 MG/DL (ref 0.57–1)
DEPRECATED RDW RBC AUTO: 48.2 FL (ref 37–54)
EOSINOPHIL # BLD AUTO: 0.01 10*3/MM3 (ref 0–0.4)
EOSINOPHIL NFR BLD AUTO: 0.2 % (ref 0.3–6.2)
ERYTHROCYTE [DISTWIDTH] IN BLOOD BY AUTOMATED COUNT: 15.3 % (ref 12.3–15.4)
GFR SERPL CREATININE-BSD FRML MDRD: 55 ML/MIN/1.73
GFR SERPL CREATININE-BSD FRML MDRD: 67 ML/MIN/1.73
GLUCOSE BLD-MCNC: 82 MG/DL (ref 65–99)
HCT VFR BLD AUTO: 32.9 % (ref 34–46.6)
HGB BLD-MCNC: 11.2 G/DL (ref 12–15.9)
IMM GRANULOCYTES # BLD AUTO: 0.01 10*3/MM3 (ref 0–0.05)
IMM GRANULOCYTES NFR BLD AUTO: 0.2 % (ref 0–0.5)
LYMPHOCYTES # BLD AUTO: 1.1 10*3/MM3 (ref 0.7–3.1)
LYMPHOCYTES NFR BLD AUTO: 22.6 % (ref 19.6–45.3)
MCH RBC QN AUTO: 29.5 PG (ref 26.6–33)
MCHC RBC AUTO-ENTMCNC: 34 G/DL (ref 31.5–35.7)
MCV RBC AUTO: 86.6 FL (ref 79–97)
MONOCYTES # BLD AUTO: 0.32 10*3/MM3 (ref 0.1–0.9)
MONOCYTES NFR BLD AUTO: 6.6 % (ref 5–12)
NEUTROPHILS # BLD AUTO: 3.41 10*3/MM3 (ref 1.7–7)
NEUTROPHILS NFR BLD AUTO: 70.2 % (ref 42.7–76)
NRBC BLD AUTO-RTO: 0 /100 WBC (ref 0–0.2)
PLATELET # BLD AUTO: 189 10*3/MM3 (ref 140–450)
PMV BLD AUTO: 10.3 FL (ref 6–12)
POTASSIUM BLD-SCNC: 3.9 MMOL/L (ref 3.5–5.2)
RBC # BLD AUTO: 3.8 10*6/MM3 (ref 3.77–5.28)
SODIUM BLD-SCNC: 138 MMOL/L (ref 136–145)
WBC NRBC COR # BLD: 4.86 10*3/MM3 (ref 3.4–10.8)

## 2020-05-20 PROCEDURE — 85025 COMPLETE CBC W/AUTO DIFF WBC: CPT

## 2020-05-20 PROCEDURE — 80048 BASIC METABOLIC PNL TOTAL CA: CPT

## 2020-05-27 NOTE — TELEPHONE ENCOUNTER
Maite reports vitals are normal, she just wont eat, drink or take medications. She is getting IV fluids. She has not been transported to the hospital as of yet. Assisted living home reports she is at end of life. Just calling as KATELYN

## 2020-05-27 NOTE — TELEPHONE ENCOUNTER
ADRIAN CALLED IN AND STATED SHE DOCTOR BERTO KNOWS WERE SHE IS SHE  WAS DIAGNOSED WITH COVID-19 AND  IS IN HER LAST STAGES.  AND SHE ISN'T EATING AND DIMENSIA IT ISEN'T GOOD . ADRIAN CALL BACK  [phone].

## 2020-05-27 NOTE — TELEPHONE ENCOUNTER
Maite called to report patient has tested positive for COVID 19 in her assisted living home. She is still in the home but has been moved to a different wing. She is not ill enough to be transported to a hospital. Daughter just calling as an FYI.

## 2022-06-29 NOTE — TELEPHONE ENCOUNTER
said the letter that was written has to come from a physician. Daughter contacted the psychiatrist who evaluated patient at [facility] and he said he does not write such letters. She is wondering if you could talk with Dr Wilson and see if he would help with this.    [email or web address],[email or web address]

## 2023-04-20 NOTE — TELEPHONE ENCOUNTER
Daughter says the rehab increased this to 30mg. The 15mg does not work for her. She only has 1 pill left and needs refill sent in today if possible.    Saucerization Depth: dermis and superficial adipose tissue